# Patient Record
Sex: FEMALE | Race: WHITE | Employment: OTHER | URBAN - NONMETROPOLITAN AREA
[De-identification: names, ages, dates, MRNs, and addresses within clinical notes are randomized per-mention and may not be internally consistent; named-entity substitution may affect disease eponyms.]

---

## 2017-09-27 ENCOUNTER — NURSE TRIAGE (OUTPATIENT)
Dept: ADMINISTRATIVE | Age: 77
End: 2017-09-27

## 2018-07-23 ENCOUNTER — HOSPITAL ENCOUNTER (OUTPATIENT)
Age: 78
Discharge: HOME | End: 2018-07-23
Payer: MEDICARE

## 2018-07-23 LAB
ALBUMIN SERPL-MCNC: 4.1 G/DL (ref 3.5–5.1)
ALP BLD-CCNC: 69 U/L (ref 38–126)
ALT SERPL-CCNC: 28 U/L (ref 11–66)
ANION GAP SERPL CALCULATED.3IONS-SCNC: 14 MEQ/L (ref 8–16)
AST SERPL-CCNC: 26 U/L (ref 5–40)
BASOPHILS # BLD: 1 %
BASOPHILS ABSOLUTE: 0.1 THOU/MM3 (ref 0–0.1)
BILIRUB SERPL-MCNC: 0.5 MG/DL (ref 0.3–1.2)
BILIRUBIN DIRECT: < 0.2 MG/DL (ref 0–0.3)
BUN BLDV-MCNC: 15 MG/DL (ref 7–22)
CALCIUM SERPL-MCNC: 9.7 MG/DL (ref 8.5–10.5)
CHLORIDE BLD-SCNC: 99 MEQ/L (ref 98–111)
CHOLESTEROL, TOTAL: 190 MG/DL (ref 100–199)
CO2: 29 MEQ/L (ref 23–33)
CREAT SERPL-MCNC: 0.7 MG/DL (ref 0.4–1.2)
EOSINOPHIL # BLD: 1.8 %
EOSINOPHILS ABSOLUTE: 0.1 THOU/MM3 (ref 0–0.4)
ERYTHROCYTE [DISTWIDTH] IN BLOOD BY AUTOMATED COUNT: 14.4 % (ref 11.5–14.5)
ERYTHROCYTE [DISTWIDTH] IN BLOOD BY AUTOMATED COUNT: 43.7 FL (ref 35–45)
GFR SERPL CREATININE-BSD FRML MDRD: 81 ML/MIN/1.73M2
GLUCOSE BLD-MCNC: 99 MG/DL (ref 70–108)
HCT VFR BLD CALC: 44.9 % (ref 37–47)
HDLC SERPL-MCNC: 59 MG/DL
HEMOGLOBIN: 14.4 GM/DL (ref 12–16)
IMMATURE GRANS (ABS): 0.02 THOU/MM3 (ref 0–0.07)
IMMATURE GRANULOCYTES: 0.3 %
LDL CHOLESTEROL CALCULATED: 112 MG/DL
LYMPHOCYTES # BLD: 28.4 %
LYMPHOCYTES ABSOLUTE: 2.3 THOU/MM3 (ref 1–4.8)
MCH RBC QN AUTO: 27.1 PG (ref 26–33)
MCHC RBC AUTO-ENTMCNC: 32.1 GM/DL (ref 32.2–35.5)
MCV RBC AUTO: 84.4 FL (ref 81–99)
MONOCYTES # BLD: 9.4 %
MONOCYTES ABSOLUTE: 0.8 THOU/MM3 (ref 0.4–1.3)
NUCLEATED RED BLOOD CELLS: 0 /100 WBC
PLATELET # BLD: 235 THOU/MM3 (ref 130–400)
PMV BLD AUTO: 10.4 FL (ref 9.4–12.4)
POTASSIUM SERPL-SCNC: 4 MEQ/L (ref 3.5–5.2)
RBC # BLD: 5.32 MILL/MM3 (ref 4.2–5.4)
SEG NEUTROPHILS: 59.1 %
SEGMENTED NEUTROPHILS ABSOLUTE COUNT: 4.7 THOU/MM3 (ref 1.8–7.7)
SODIUM BLD-SCNC: 142 MEQ/L (ref 135–145)
TOTAL PROTEIN: 7.9 G/DL (ref 6.1–8)
TRIGL SERPL-MCNC: 93 MG/DL (ref 0–199)
TSH SERPL DL<=0.05 MIU/L-ACNC: 0.93 UIU/ML (ref 0.4–4.2)
WBC # BLD: 8 THOU/MM3 (ref 4.8–10.8)

## 2018-07-23 PROCEDURE — 80053 COMPREHEN METABOLIC PANEL: CPT

## 2018-07-23 PROCEDURE — 85025 COMPLETE CBC W/AUTO DIFF WBC: CPT

## 2018-07-23 PROCEDURE — 80061 LIPID PANEL: CPT

## 2018-07-23 PROCEDURE — 82248 BILIRUBIN DIRECT: CPT

## 2018-07-23 PROCEDURE — 36415 COLL VENOUS BLD VENIPUNCTURE: CPT

## 2018-07-23 PROCEDURE — 84443 ASSAY THYROID STIM HORMONE: CPT

## 2018-09-25 ENCOUNTER — INITIAL CONSULT (OUTPATIENT)
Dept: PULMONOLOGY | Age: 78
End: 2018-09-25
Payer: MEDICARE

## 2018-09-25 VITALS
OXYGEN SATURATION: 95 % | DIASTOLIC BLOOD PRESSURE: 78 MMHG | WEIGHT: 222.8 LBS | HEIGHT: 65 IN | BODY MASS INDEX: 37.12 KG/M2 | HEART RATE: 107 BPM | SYSTOLIC BLOOD PRESSURE: 116 MMHG

## 2018-09-25 DIAGNOSIS — R06.83 SNORING: ICD-10-CM

## 2018-09-25 DIAGNOSIS — G47.9 RESTLESS SLEEPER: ICD-10-CM

## 2018-09-25 DIAGNOSIS — I10 ESSENTIAL HYPERTENSION: ICD-10-CM

## 2018-09-25 DIAGNOSIS — E66.9 OBESITY (BMI 30-39.9): ICD-10-CM

## 2018-09-25 DIAGNOSIS — G47.10 HYPERSOMNIA: Primary | ICD-10-CM

## 2018-09-25 DIAGNOSIS — R06.81 WITNESSED EPISODE OF APNEA: ICD-10-CM

## 2018-09-25 PROCEDURE — 4040F PNEUMOC VAC/ADMIN/RCVD: CPT | Performed by: INTERNAL MEDICINE

## 2018-09-25 PROCEDURE — 1036F TOBACCO NON-USER: CPT | Performed by: INTERNAL MEDICINE

## 2018-09-25 PROCEDURE — G8400 PT W/DXA NO RESULTS DOC: HCPCS | Performed by: INTERNAL MEDICINE

## 2018-09-25 PROCEDURE — 99203 OFFICE O/P NEW LOW 30 MIN: CPT | Performed by: INTERNAL MEDICINE

## 2018-09-25 PROCEDURE — G8417 CALC BMI ABV UP PARAM F/U: HCPCS | Performed by: INTERNAL MEDICINE

## 2018-09-25 PROCEDURE — 1123F ACP DISCUSS/DSCN MKR DOCD: CPT | Performed by: INTERNAL MEDICINE

## 2018-09-25 PROCEDURE — G8427 DOCREV CUR MEDS BY ELIG CLIN: HCPCS | Performed by: INTERNAL MEDICINE

## 2018-09-25 PROCEDURE — 1090F PRES/ABSN URINE INCON ASSESS: CPT | Performed by: INTERNAL MEDICINE

## 2018-09-25 PROCEDURE — 1101F PT FALLS ASSESS-DOCD LE1/YR: CPT | Performed by: INTERNAL MEDICINE

## 2018-09-25 RX ORDER — POTASSIUM CHLORIDE 1.5 G/1.77G
20 POWDER, FOR SOLUTION ORAL 2 TIMES DAILY
COMMUNITY
End: 2022-03-03 | Stop reason: DRUGHIGH

## 2018-09-25 RX ORDER — BUMETANIDE 1 MG/1
1 TABLET ORAL DAILY
COMMUNITY
End: 2022-10-04 | Stop reason: SDUPTHER

## 2018-09-25 NOTE — PROGRESS NOTES
Sig Dispense Refill    potassium chloride (KLOR-CON) 20 MEQ packet Take 20 mEq by mouth 2 times daily      bumetanide (BUMEX) 1 MG tablet Take 1 mg by mouth daily      diltiazem (CARDIZEM CD) 120 MG extended release capsule Take 120 mg by mouth daily      metoprolol succinate (TOPROL XL) 25 MG extended release tablet Take 25 mg by mouth daily      aspirin 81 MG tablet Take 81 mg by mouth daily. No current facility-administered medications for this visit. Family History   Problem Relation Age of Onset   [facility] Cancer Mother     Stroke Mother     Diabetes Sister     Diabetes Brother     Heart Disease Neg Hx     High Blood Pressure Neg Hx         Any family history of any sleep problems or any one in your family on CPAP? No    Caffeine Intake: How much soda (pop), coffee, tea, power drinks do you ingest per day?1 up of coffee and 3 cans of pop per day. Employment History:  Where do you work? Retired    Physical Exam:    HEIGHTHeight: 5' 5\" (165.1 cm) WEIGHTWeight: 222 lb 12.8 oz (101.1 kg)    BMI:  Body mass index is 37.08 kg/m². Neck Size: 18.75  Oxygen Sat: 95%    ESS:  6  Vitals: /78   Pulse 107   Ht 5' 5\" (1.651 m)   Wt 222 lb 12.8 oz (101.1 kg)   SpO2 95% Comment: room air at rest  BMI 37.08 kg/m²       Mallampati Score: 3  General appearance: BMI 37  Nose: Nares normal. Septum midline. Mucosa normal. No drainage or sinus tenderness. Oropharynx:  Large tongue, crowded pharynx, mallampati class 3, upper and lower dentures  Lungs: clear to auscultation bilaterally  Heart: regular rate and rhythm, S1, S2 normal, no murmur, click, rub or gallop  Extremities: 1+ LE edema  Neurologic: Mental status: Alert, oriented, thought content appropriate      Assessment    Diagnosis Orders   1. Hypersomnia  Baseline Diagnostic Sleep Study   2. Obesity (BMI 30-39. 9)  Baseline Diagnostic Sleep Study   3. Snoring  Baseline Diagnostic Sleep Study   4.  Restless sleeper  Baseline Diagnostic Sleep Study 5. Witnessed episode of apnea  Baseline Diagnostic Sleep Study   6. Essential hypertension  Baseline Diagnostic Sleep Study       Plan      PSG with RBD montage    Mask Desensitization and Pre study teaching? No  Weight Loss Information Given? Yes  Sleep Hygiene Discussed?  Yes

## 2018-09-25 NOTE — LETTER
[address] [facility]  [address]  [address]  [place] [address]  Phone: [phone]  Fax: [phone]    No ref. provider found        September 25, 2018       Patient: Dmitri You   MR Number: [id number]   YOB: 1940   Date of Visit: 9/25/2018       Dear Dr. Peter Cohen ref. provider found: Thank you for the request for consultation for Krys Parrish to me for the evaluation of MAMI. Below are the relevant portions of my assessment and plan of care. CC: Hypersomnia     New Sleep Patient H/P    Presentation:  Keyshawn Arambula is referred by [facility], [facility]. for daytime sleepiness  Keyshawn Arambula c/o excessive daytime somnolence, frequent arousals and makes funny noises during the night, hollers and acts on her dreams, snores loudly. Her sleep is non refreshing with multiple arousals for no apparent reason. , takes naps daily. Cannot sleep on her back, sleeps on her side and with HOB elevate, very restless sleeper    Symptoms began:  1 year ago. Symptoms include: snoring, choking, periods of not breathing during sleep, excessive daytime sleepiness, disrupted sleep, naps    Previous evaluation and treatment? No        Time in Bed:   Bedtime: 11p.m. Awakens  6 a.m. Different on weekends? No      Niesha falls asleep in 10  minutes. Any awakenings? Yes  Difficulty Falling back to sleep? No    Naps:  Any naps? Yes and are they helpful Yes    Snoring and Apneas:  Do you snore or been told you a snore? Yes  How long have known about your snoring? years  Any witnessed apneas? Yes  Any awakenings with choking or gasping? No    Dreams:  Any recurring dreams? Yes  Hallucinations? No  Sleep Paralysis? No  Symptoms of Cataplexy? No    Driving History:  Do you have a CDL or drive long distances for work? No  Any driving accidents in the past year? No  Any sleepiness while driving? No    Weight:  Any change in weight over the past year? Yes   How about past 5 years? Yes  How much?  10 Other Compliants :Sharif Cruz complains of tossing and turning, kicking as well. Past Medical History:   Diagnosis Date    Arthritis     general       Past Surgical History:   Procedure Laterality Date    APPENDECTOMY  2014    [facility]ATION  07[facility]07/[facility]    CHOLECYSTECTOMY  2002    HYSTERECTOMY  1990       Social History   Substance Use Topics    Smoking status: Never Smoker    Smokeless tobacco: Never Used    Alcohol use No       No Known Allergies    Current Outpatient Prescriptions   Medication Sig Dispense Refill    potassium chloride (KLOR-CON) 20 MEQ packet Take 20 mEq by mouth 2 times daily      bumetanide (BUMEX) 1 MG tablet Take 1 mg by mouth daily      diltiazem (CARDIZEM CD) 120 MG extended release capsule Take 120 mg by mouth daily      metoprolol succinate (TOPROL XL) 25 MG extended release tablet Take 25 mg by mouth daily      aspirin 81 MG tablet Take 81 mg by mouth daily. No current facility-administered medications for this visit. Family History   Problem Relation Age of Onset   [facility] Cancer Mother     Stroke Mother     Diabetes Sister     Diabetes Brother     Heart Disease Neg Hx     High Blood Pressure Neg Hx         Any family history of any sleep problems or any one in your family on CPAP? No    Caffeine Intake: How much soda (pop), coffee, tea, power drinks do you ingest per day?1 up of coffee and 3 cans of pop per day. Employment History:  Where do you work? Retired    Physical Exam:    HEIGHTHeight: 5' 5\" (165.1 cm) WEIGHTWeight: 222 lb 12.8 oz (101.1 kg)    BMI:  Body mass index is 37.08 kg/m². Neck Size: 18.75  Oxygen Sat: 95%    ESS:  6  Vitals: /78   Pulse 107   Ht 5' 5\" (1.651 m)   Wt 222 lb 12.8 oz (101.1 kg)   SpO2 95% Comment: room air at rest  BMI 37.08 kg/m²        Mallampati Score: 3  General appearance: BMI 37  Nose: Nares normal. Septum midline. Mucosa normal. No drainage or sinus tenderness. Oropharynx:  Large tongue, crowded pharynx, mallampati class 3, upper and lower dentures  Lungs: clear to auscultation bilaterally  Heart: regular rate and rhythm, S1, S2 normal, no murmur, click, rub or gallop  Extremities: 1+ LE edema  Neurologic: Mental status: Alert, oriented, thought content appropriate      Assessment    Diagnosis Orders   1. Hypersomnia  Baseline Diagnostic Sleep Study   2. Obesity (BMI 30-39. 9)  Baseline Diagnostic Sleep Study   3. Snoring  Baseline Diagnostic Sleep Study   4. Restless sleeper  Baseline Diagnostic Sleep Study   5. Witnessed episode of apnea  Baseline Diagnostic Sleep Study   6. Essential hypertension  Baseline Diagnostic Sleep Study       Plan      PSG with RBD montage    Mask Desensitization and Pre study teaching? No  Weight Loss Information Given? Yes  Sleep Hygiene Discussed? Yes        If you have questions, please do not hesitate to call me. I look forward to following Rebecca Arguello along with you.     Sincerely,        Marce Siddiqui MD    CC providers:  No Recipients

## 2018-09-25 NOTE — COMMUNICATION BODY
CC: Hypersomnia     New Sleep Patient H/P    Presentation:  Sujey Encinas is referred by Darion Stephanie for daytime sleepiness  Sujey Encinas c/o excessive daytime somnolence, frequent arousals and makes funny noises during the night, hollers and acts on her dreams, snores loudly. Her sleep is non refreshing with multiple arousals for no apparent reason. , takes naps daily. Cannot sleep on her back, sleeps on her side and with HOB elevate, very restless sleeper    Symptoms began:  1 year ago. Symptoms include: snoring, choking, periods of not breathing during sleep, excessive daytime sleepiness, disrupted sleep, naps    Previous evaluation and treatment? No        Time in Bed:   Bedtime: 11p.m. Awakens  6 a.m. Different on weekends? No      Niesha falls asleep in 10  minutes. Any awakenings? Yes  Difficulty Falling back to sleep? No    Naps:  Any naps? Yes and are they helpful Yes    Snoring and Apneas:  Do you snore or been told you a snore? Yes  How long have known about your snoring? years  Any witnessed apneas? Yes  Any awakenings with choking or gasping? No    Dreams:  Any recurring dreams? Yes  Hallucinations? No  Sleep Paralysis? No  Symptoms of Cataplexy? No    Driving History:  Do you have a CDL or drive long distances for work? No  Any driving accidents in the past year? No  Any sleepiness while driving? No    Weight:  Any change in weight over the past year? Yes   How about past 5 years? Yes  How much? 10    Other Compliants :Sujey Encinas complains of tossing and turning, kicking as well.     Past Medical History:   Diagnosis Date    Arthritis     general       Past Surgical History:   Procedure Laterality Date    APPENDECTOMY  2014    [facility]    CHOLECYSTECTOMY  2002    HYSTERECTOMY  1990       Social History   Substance Use Topics    Smoking status: Never Smoker    Smokeless tobacco: Never Used    Alcohol use No       No Known Allergies    Current Outpatient Prescriptions   Medication 5. Witnessed episode of apnea  Baseline Diagnostic Sleep Study   6. Essential hypertension  Baseline Diagnostic Sleep Study       Plan      PSG with RBD montage    Mask Desensitization and Pre study teaching? No  Weight Loss Information Given? Yes  Sleep Hygiene Discussed?  Yes

## 2018-10-18 ENCOUNTER — HOSPITAL ENCOUNTER (OUTPATIENT)
Dept: SLEEP CENTER | Age: 78
Discharge: HOME | End: 2018-10-20
Payer: MEDICARE

## 2018-10-18 DIAGNOSIS — G47.9 RESTLESS SLEEPER: ICD-10-CM

## 2018-10-18 DIAGNOSIS — R06.81 WITNESSED EPISODE OF APNEA: ICD-10-CM

## 2018-10-18 DIAGNOSIS — I10 ESSENTIAL HYPERTENSION: ICD-10-CM

## 2018-10-18 DIAGNOSIS — G47.10 HYPERSOMNIA: ICD-10-CM

## 2018-10-18 DIAGNOSIS — E66.9 OBESITY (BMI 30-39.9): ICD-10-CM

## 2018-10-18 DIAGNOSIS — R06.83 SNORING: ICD-10-CM

## 2018-10-18 PROCEDURE — 95810 POLYSOM 6/> YRS 4/> PARAM: CPT

## 2018-10-22 LAB — STATUS: NORMAL

## 2018-10-25 NOTE — PROGRESS NOTES
[address] [place] [address]                              SLEEP STUDY REPORT    PATIENT NAME: Iban Townsend                       :         1940  MED REC NO:   [id number]                           ROOM:  ACCOUNT NO:   [de-identified]                           ADMIT DATE:  10/18/2018  PROVIDER:     CHELITA Wells Dial:  10/18/2018    DIAGNOSTIC POLYSOMNOGRAM    REFERRING PROVIDER:  Catina Ward. Dnoald Akins M.D. HISTORY OF PRESENT ILLNESS:  The patient is a 27-year-old female,  referred by her cardiologist for concern regarding obstructive sleep  apnea due to complaints of snoring, restless sleep, chronic fatigue. Her weight is 222 pounds, height 65 inches, BMI 36.9. METHODS:  The patient underwent digital polysomnography in compliance  with the standards and specifications from the AASM Manual including  the simultaneous recording of 3 EEG channels (F4-M1, C4-M1, and O2-M1  with back up electrodes F3-M2, C3-M2, and O1-M2), 2 EOG channels  (E1-M2, and E2-M1,), EMG (chin, left & right leg), EKG, Nonin pulse  oximetry with less than 2 second averaging time, body position,  airflow recorded by oral-nasal thermal sensor and nasal air pressure  transducer, plus respiratory effort recorded by calibrated respiratory  inductance plethysmography (RIP), flow volume loop, sound and video. Sleep staging and scoring followed the standard put forth by the  American Academy of Sleep Medicine and utilized the 4A obstructive  hypopnea event desaturation of 4 percent or greater. DETAILS OF THE STUDY: Lights out 09:49 p.m., lights on 04:23 a.m. Total recording time  393 minutes, sleep period time 352 minutes, total sleep time 211  minutes, sleep efficiency 53.8%. Latency to sleep 40 minutes. Wake  after sleep onset 141 minutes. Latency to  minutes.     SLEEP STAGING:  The patient slept 39.5 minutes or 18.7% of total sleep  time in N1 sleep, 139 minutes or 65.7% in N2 sleep, 19.5 minutes or  9.2% in N3 sleep, 13.5 minutes or 6.4% in REM sleep. RESPIRATORY SUMMARY:  Zero apneas, 24 obstructive hypopneas for an AHI  of 6.8; however during REM sleep the obstructive events become severe  with the REM AHI of 35.6. BODY POSITION SUMMARY:  43 minutes of prone sleep, 158.5 minutes of  right side sleep. SLEEP DISTURBANCE SUMMARY:  There were total of 58 arousals, out of  which 28 were related to respiratory events for a respiratory arousal  index of 7.9. PERIODIC LIMB MOVEMENT SUMMARY:  There were none. PULSE OXIMETRY SUMMARY:  Mean oxygen saturation during wakefulness  92.5%, during  sleep 90.7%. Lowest oxygen saturation 83%. There were 12 minutes of  oxygen  saturation below 88%. ECG SUMMARY:  The patient remained in normal sinus rhythm with a mean  heart rate of 79  beats per minute during sleep. ASSESSMENT:  Mild obstructive sleep apnea during non-supine sleep with  an AHI of 6.8, lack of supine recording can underestimate severity of MAMI. Obstructive events become severe during REM sleep with a REM AHI  of 35.6. Sleep disruption and nocturnal oxygen desaturation  associated to obstructive events. RECOMMENDATIONS:  Due to the severity of the disruption, I would  strongly recommend PAP therapies. The patient will be contacted. The  findings and therapeutic alternative will be reviewed and we will  proceed accordingly.         Nely Ribeiro M.D.    D: 10/24/2018 21:36:51       T: 10/25/2018 0:17:09     [email or web address]  Job#: [id number]     Doc#: [id number]    CC:

## 2018-11-06 ENCOUNTER — OFFICE VISIT (OUTPATIENT)
Dept: PULMONOLOGY | Age: 78
End: 2018-11-06
Payer: MEDICARE

## 2018-11-06 VITALS
DIASTOLIC BLOOD PRESSURE: 82 MMHG | BODY MASS INDEX: 36.82 KG/M2 | OXYGEN SATURATION: 96 % | WEIGHT: 221 LBS | HEIGHT: 65 IN | SYSTOLIC BLOOD PRESSURE: 130 MMHG | HEART RATE: 80 BPM

## 2018-11-06 DIAGNOSIS — G47.10 HYPERSOMNIA: ICD-10-CM

## 2018-11-06 DIAGNOSIS — G47.33 OSA (OBSTRUCTIVE SLEEP APNEA): Primary | ICD-10-CM

## 2018-11-06 PROCEDURE — 1123F ACP DISCUSS/DSCN MKR DOCD: CPT | Performed by: INTERNAL MEDICINE

## 2018-11-06 PROCEDURE — G8400 PT W/DXA NO RESULTS DOC: HCPCS | Performed by: INTERNAL MEDICINE

## 2018-11-06 PROCEDURE — 1090F PRES/ABSN URINE INCON ASSESS: CPT | Performed by: INTERNAL MEDICINE

## 2018-11-06 PROCEDURE — 99213 OFFICE O/P EST LOW 20 MIN: CPT | Performed by: INTERNAL MEDICINE

## 2018-11-06 PROCEDURE — 4040F PNEUMOC VAC/ADMIN/RCVD: CPT | Performed by: INTERNAL MEDICINE

## 2018-11-06 PROCEDURE — 1036F TOBACCO NON-USER: CPT | Performed by: INTERNAL MEDICINE

## 2018-11-06 PROCEDURE — G8417 CALC BMI ABV UP PARAM F/U: HCPCS | Performed by: INTERNAL MEDICINE

## 2018-11-06 PROCEDURE — G8427 DOCREV CUR MEDS BY ELIG CLIN: HCPCS | Performed by: INTERNAL MEDICINE

## 2018-11-06 PROCEDURE — G8484 FLU IMMUNIZE NO ADMIN: HCPCS | Performed by: INTERNAL MEDICINE

## 2018-11-06 PROCEDURE — 1101F PT FALLS ASSESS-DOCD LE1/YR: CPT | Performed by: INTERNAL MEDICINE

## 2018-11-06 RX ORDER — NEBULIZER ACCESSORIES
EACH MISCELLANEOUS
Qty: 1 EACH | Refills: 0 | Status: SHIPPED | OUTPATIENT
Start: 2018-11-06

## 2018-11-07 ENCOUNTER — HOSPITAL ENCOUNTER (OUTPATIENT)
Dept: SLEEP CENTER | Age: 78
Discharge: HOME | End: 2018-11-09
Payer: MEDICARE

## 2018-11-07 DIAGNOSIS — G47.10 HYPERSOMNIA: ICD-10-CM

## 2018-11-07 DIAGNOSIS — G47.33 OSA (OBSTRUCTIVE SLEEP APNEA): ICD-10-CM

## 2018-11-07 PROCEDURE — 95811 POLYSOM 6/>YRS CPAP 4/> PARM: CPT

## 2018-11-08 LAB — STATUS: NORMAL

## 2018-11-12 DIAGNOSIS — G47.33 OSA (OBSTRUCTIVE SLEEP APNEA): Primary | ICD-10-CM

## 2018-11-12 NOTE — PROGRESS NOTES
[address] [place] [address]                               SLEEP STUDY REPORT    PATIENT NAME: Lisseth Pinto                       :        1940  MED REC NO:   [id number]                           ROOM:  ACCOUNT NO:   [de-identified]                           ADMIT DATE: 2018  PROVIDER:     Silverio Fierro M.D.    Isidro Zamora:  2018    CPAP TITRATION REPORT    REFERRING PROVIDER:  Silverio Fierro M.D. HISTORY OF PRESENT ILLNESS:  The patient is brought back to the sleep  lab for a PAP titration study with a diagnosis of obstructive sleep  apnea. METHODS:  The patient underwent digital polysomnography in compliance  with the standards and specifications from the AASM Manual including the  simultaneous recording of 3 EEG channels (F4-M1, C4-M1, and O2-M1 with  back up electrodes F3-M2, C3-M2, and O1-M2), 2 EOG channels (E1-M2, and  E2-M1,), EMG (chin, left & right leg), EKG, Nonin pulse oximetry with   less than 2 second averaging time, body position, airflow recorded by  oral-nasal thermal sensor and nasal air pressure transducer, plus  respiratory effort recorded by calibrated respiratory inductance  plethysmography (RIP), flow volume loop, sound and video. Sleep staging  and scoring followed the standard put forth by the American Academy of  Sleep Medicine and utilized the 4A obstructive hypopnea event  desaturation of 4 percent or greater. DETAILS OF THE STUDY:  Lights out 10:08 p.m., lights on 04:04 a.m. Total recording time 363 minutes, sleep period time 349 minutes, total  sleep time 207 minutes, sleep efficiency 57%. Latency to sleep 14.7  minutes, wake after sleep onset 141 minutes, latency to  minutes. SLEEP STAGING:  The patient slept 17.8% of total sleep time in N1 sleep,  71.8% in N2 sleep, 5.3% in N3 sleep, 5.1% in REM sleep.     BODY POSITION SUMMARY:  The patient slept 153 minutes supine, 54

## 2018-11-15 ENCOUNTER — TELEPHONE (OUTPATIENT)
Dept: SLEEP CENTER | Age: 78
End: 2018-11-15

## 2018-11-16 DIAGNOSIS — G47.33 OSA (OBSTRUCTIVE SLEEP APNEA): Primary | ICD-10-CM

## 2018-11-19 ENCOUNTER — TELEPHONE (OUTPATIENT)
Dept: SLEEP CENTER | Age: 78
End: 2018-11-19

## 2019-01-08 ENCOUNTER — OFFICE VISIT (OUTPATIENT)
Dept: PULMONOLOGY | Age: 79
End: 2019-01-08
Payer: MEDICARE

## 2019-01-08 VITALS
SYSTOLIC BLOOD PRESSURE: 124 MMHG | OXYGEN SATURATION: 95 % | HEART RATE: 78 BPM | DIASTOLIC BLOOD PRESSURE: 78 MMHG | WEIGHT: 222 LBS | BODY MASS INDEX: 36.99 KG/M2 | HEIGHT: 65 IN | RESPIRATION RATE: 22 BRPM

## 2019-01-08 DIAGNOSIS — G47.33 OSA ON CPAP: Primary | ICD-10-CM

## 2019-01-08 DIAGNOSIS — Z99.89 OSA ON CPAP: Primary | ICD-10-CM

## 2019-01-08 DIAGNOSIS — E66.9 OBESITY (BMI 30-39.9): ICD-10-CM

## 2019-01-08 PROCEDURE — 1123F ACP DISCUSS/DSCN MKR DOCD: CPT | Performed by: INTERNAL MEDICINE

## 2019-01-08 PROCEDURE — 1101F PT FALLS ASSESS-DOCD LE1/YR: CPT | Performed by: INTERNAL MEDICINE

## 2019-01-08 PROCEDURE — G8484 FLU IMMUNIZE NO ADMIN: HCPCS | Performed by: INTERNAL MEDICINE

## 2019-01-08 PROCEDURE — 1090F PRES/ABSN URINE INCON ASSESS: CPT | Performed by: INTERNAL MEDICINE

## 2019-01-08 PROCEDURE — 4040F PNEUMOC VAC/ADMIN/RCVD: CPT | Performed by: INTERNAL MEDICINE

## 2019-01-08 PROCEDURE — G8400 PT W/DXA NO RESULTS DOC: HCPCS | Performed by: INTERNAL MEDICINE

## 2019-01-08 PROCEDURE — 1036F TOBACCO NON-USER: CPT | Performed by: INTERNAL MEDICINE

## 2019-01-08 PROCEDURE — G8427 DOCREV CUR MEDS BY ELIG CLIN: HCPCS | Performed by: INTERNAL MEDICINE

## 2019-01-08 PROCEDURE — 99213 OFFICE O/P EST LOW 20 MIN: CPT | Performed by: INTERNAL MEDICINE

## 2019-01-08 PROCEDURE — G8417 CALC BMI ABV UP PARAM F/U: HCPCS | Performed by: INTERNAL MEDICINE

## 2019-02-15 ENCOUNTER — TELEPHONE (OUTPATIENT)
Dept: ADMINISTRATIVE | Age: 79
End: 2019-02-15

## 2019-07-15 ENCOUNTER — OFFICE VISIT (OUTPATIENT)
Dept: PULMONOLOGY | Age: 79
End: 2019-07-15
Payer: MEDICARE

## 2019-07-15 VITALS
BODY MASS INDEX: 36.29 KG/M2 | SYSTOLIC BLOOD PRESSURE: 122 MMHG | HEART RATE: 81 BPM | OXYGEN SATURATION: 93 % | WEIGHT: 217.8 LBS | DIASTOLIC BLOOD PRESSURE: 76 MMHG | HEIGHT: 65 IN

## 2019-07-15 DIAGNOSIS — E66.9 OBESITY (BMI 30-39.9): ICD-10-CM

## 2019-07-15 DIAGNOSIS — G47.33 OSA (OBSTRUCTIVE SLEEP APNEA): Primary | ICD-10-CM

## 2019-07-15 PROCEDURE — 1090F PRES/ABSN URINE INCON ASSESS: CPT | Performed by: INTERNAL MEDICINE

## 2019-07-15 PROCEDURE — G8400 PT W/DXA NO RESULTS DOC: HCPCS | Performed by: INTERNAL MEDICINE

## 2019-07-15 PROCEDURE — G8427 DOCREV CUR MEDS BY ELIG CLIN: HCPCS | Performed by: INTERNAL MEDICINE

## 2019-07-15 PROCEDURE — 1036F TOBACCO NON-USER: CPT | Performed by: INTERNAL MEDICINE

## 2019-07-15 PROCEDURE — G8417 CALC BMI ABV UP PARAM F/U: HCPCS | Performed by: INTERNAL MEDICINE

## 2019-07-15 PROCEDURE — 99213 OFFICE O/P EST LOW 20 MIN: CPT | Performed by: INTERNAL MEDICINE

## 2019-07-15 PROCEDURE — 1123F ACP DISCUSS/DSCN MKR DOCD: CPT | Performed by: INTERNAL MEDICINE

## 2019-07-15 PROCEDURE — 4040F PNEUMOC VAC/ADMIN/RCVD: CPT | Performed by: INTERNAL MEDICINE

## 2019-07-15 NOTE — PROGRESS NOTES
Sleep Medicine Follow Up  Tom Skinner MD    Yamileth Singh, 67 y.o.  [id number]     Nurses Notes   Jasmina Choi is a 6 month f/u with download    Date of Last Visit    1/8/19    Scan of Download        INTERVAL HISTORY         Yamileth Singh is a 67 y.o. old female who comes in for follow up regarding her obstructive sleep apnea. She is currently doing well using her positive airway pressure device. She is sleeping better at night with her machine and says she has less daytime sleepiness. The machine pressure settings are comfortable, the mask is reasonably comfortable and she is using the humidity. There have been no ER visits, hospital visits, any new issues or medication changes since the last visit here in sleep clinic. Past Medical History:   Diagnosis Date    Arthritis     general     Past Surgical History:   Procedure Laterality Date    APPENDECTOMY  2014    [facility]    CHOLECYSTECTOMY  2002    HYSTERECTOMY  1990       Social History     Tobacco Use    Smoking status: Never Smoker    Smokeless tobacco: Never Used   Substance Use Topics    Alcohol use: No    Drug use: No       ALLERGIES  No Known Allergies  Current Outpatient Medications   Medication Sig Dispense Refill    Respiratory Therapy Supplies (ADULT MASK) MISC Please do mask desensitization and provide mask of patient's choice. 1 each 0    potassium chloride (KLOR-CON) 20 MEQ packet Take 20 mEq by mouth 2 times daily      bumetanide (BUMEX) 1 MG tablet Take 1 mg by mouth daily      diltiazem (CARDIZEM CD) 120 MG extended release capsule Take 120 mg by mouth daily      metoprolol succinate (TOPROL XL) 25 MG extended release tablet Take 25 mg by mouth daily      aspirin 81 MG tablet Take 81 mg by mouth daily. No current facility-administered medications for this visit.       Family History   Problem Relation Age of Onset    Cancer Mother     Stroke Mother     Diabetes Sister     Diabetes Brother

## 2019-07-31 ENCOUNTER — HOSPITAL ENCOUNTER (OUTPATIENT)
Age: 79
Discharge: HOME | End: 2019-07-31
Payer: MEDICARE

## 2019-07-31 LAB
ALBUMIN SERPL-MCNC: 3.8 G/DL (ref 3.5–5.1)
ALP BLD-CCNC: 73 U/L (ref 38–126)
ALT SERPL-CCNC: 19 U/L (ref 11–66)
ANION GAP SERPL CALCULATED.3IONS-SCNC: 14 MEQ/L (ref 8–16)
AST SERPL-CCNC: 20 U/L (ref 5–40)
BASOPHILS # BLD: 0.8 %
BASOPHILS ABSOLUTE: 0.1 THOU/MM3 (ref 0–0.1)
BILIRUB SERPL-MCNC: 0.5 MG/DL (ref 0.3–1.2)
BILIRUBIN DIRECT: < 0.2 MG/DL (ref 0–0.3)
BUN BLDV-MCNC: 13 MG/DL (ref 7–22)
CALCIUM SERPL-MCNC: 9.5 MG/DL (ref 8.5–10.5)
CHLORIDE BLD-SCNC: 102 MEQ/L (ref 98–111)
CHOLESTEROL, TOTAL: 151 MG/DL (ref 100–199)
CO2: 29 MEQ/L (ref 23–33)
CREAT SERPL-MCNC: 0.7 MG/DL (ref 0.4–1.2)
EOSINOPHIL # BLD: 2.2 %
EOSINOPHILS ABSOLUTE: 0.1 THOU/MM3 (ref 0–0.4)
ERYTHROCYTE [DISTWIDTH] IN BLOOD BY AUTOMATED COUNT: 13.7 % (ref 11.5–14.5)
ERYTHROCYTE [DISTWIDTH] IN BLOOD BY AUTOMATED COUNT: 42.9 FL (ref 35–45)
GFR SERPL CREATININE-BSD FRML MDRD: 81 ML/MIN/1.73M2
GLUCOSE BLD-MCNC: 101 MG/DL (ref 70–108)
HCT VFR BLD CALC: 41.9 % (ref 37–47)
HDLC SERPL-MCNC: 55 MG/DL
HEMOGLOBIN: 13.4 GM/DL (ref 12–16)
IMMATURE GRANS (ABS): 0.02 THOU/MM3 (ref 0–0.07)
IMMATURE GRANULOCYTES: 0.3 %
LDL CHOLESTEROL CALCULATED: 76 MG/DL
LYMPHOCYTES # BLD: 29.4 %
LYMPHOCYTES ABSOLUTE: 1.9 THOU/MM3 (ref 1–4.8)
MCH RBC QN AUTO: 27.6 PG (ref 26–33)
MCHC RBC AUTO-ENTMCNC: 32 GM/DL (ref 32.2–35.5)
MCV RBC AUTO: 86.4 FL (ref 81–99)
MONOCYTES # BLD: 9.3 %
MONOCYTES ABSOLUTE: 0.6 THOU/MM3 (ref 0.4–1.3)
NUCLEATED RED BLOOD CELLS: 0 /100 WBC
PLATELET # BLD: 227 THOU/MM3 (ref 130–400)
PMV BLD AUTO: 10.4 FL (ref 9.4–12.4)
POTASSIUM SERPL-SCNC: 3.9 MEQ/L (ref 3.5–5.2)
RBC # BLD: 4.85 MILL/MM3 (ref 4.2–5.4)
SEG NEUTROPHILS: 58 %
SEGMENTED NEUTROPHILS ABSOLUTE COUNT: 3.7 THOU/MM3 (ref 1.8–7.7)
SODIUM BLD-SCNC: 145 MEQ/L (ref 135–145)
TOTAL PROTEIN: 7.2 G/DL (ref 6.1–8)
TRIGL SERPL-MCNC: 101 MG/DL (ref 0–199)
WBC # BLD: 6.4 THOU/MM3 (ref 4.8–10.8)

## 2019-07-31 PROCEDURE — 36415 COLL VENOUS BLD VENIPUNCTURE: CPT

## 2019-07-31 PROCEDURE — 85025 COMPLETE CBC W/AUTO DIFF WBC: CPT

## 2019-07-31 PROCEDURE — 80053 COMPREHEN METABOLIC PANEL: CPT

## 2019-07-31 PROCEDURE — 82248 BILIRUBIN DIRECT: CPT

## 2019-07-31 PROCEDURE — 80061 LIPID PANEL: CPT

## 2020-07-15 ENCOUNTER — OFFICE VISIT (OUTPATIENT)
Dept: PULMONOLOGY | Age: 80
End: 2020-07-15
Payer: MEDICARE

## 2020-07-15 VITALS
TEMPERATURE: 97.7 F | BODY MASS INDEX: 36.11 KG/M2 | OXYGEN SATURATION: 98 % | SYSTOLIC BLOOD PRESSURE: 118 MMHG | DIASTOLIC BLOOD PRESSURE: 78 MMHG | WEIGHT: 217 LBS | HEART RATE: 98 BPM

## 2020-07-15 PROCEDURE — 1123F ACP DISCUSS/DSCN MKR DOCD: CPT | Performed by: PHYSICIAN ASSISTANT

## 2020-07-15 PROCEDURE — G8400 PT W/DXA NO RESULTS DOC: HCPCS | Performed by: PHYSICIAN ASSISTANT

## 2020-07-15 PROCEDURE — 99213 OFFICE O/P EST LOW 20 MIN: CPT | Performed by: PHYSICIAN ASSISTANT

## 2020-07-15 PROCEDURE — 1036F TOBACCO NON-USER: CPT | Performed by: PHYSICIAN ASSISTANT

## 2020-07-15 PROCEDURE — G8427 DOCREV CUR MEDS BY ELIG CLIN: HCPCS | Performed by: PHYSICIAN ASSISTANT

## 2020-07-15 PROCEDURE — G8417 CALC BMI ABV UP PARAM F/U: HCPCS | Performed by: PHYSICIAN ASSISTANT

## 2020-07-15 PROCEDURE — 4040F PNEUMOC VAC/ADMIN/RCVD: CPT | Performed by: PHYSICIAN ASSISTANT

## 2020-07-15 PROCEDURE — 1090F PRES/ABSN URINE INCON ASSESS: CPT | Performed by: PHYSICIAN ASSISTANT

## 2020-07-15 ASSESSMENT — ENCOUNTER SYMPTOMS
ALLERGIC/IMMUNOLOGIC NEGATIVE: 1
BACK PAIN: 0
SHORTNESS OF BREATH: 0
COUGH: 0
CHEST TIGHTNESS: 0
NAUSEA: 0
WHEEZING: 0
STRIDOR: 0
EYES NEGATIVE: 1
DIARRHEA: 0

## 2020-07-15 NOTE — PROGRESS NOTES
[facility]      Jose Maria Landeros         [id number]  7/15/2020   Chief Complaint   Patient presents with    Follow-up     sleep f/u, last seen 7/15/19. Pt of Dr. Alfonso ADAIR Download:   Original or initial AHI: 68     Date of initial study: 10/18/18      Compliant  90%     Noncompliant 10 %     PAP Type AirSense 10 Level  6cmH2O    Avg Hrs/Day 5hrs 12min   AHI: 5.4   Recorded compliance dates , 6/7/20-7/6/20  Machine/Mfg: ResMed  Interface: pillows     Provider:    __[facility]           Denton Marking        __ [facility]    _x_ Amy Pickup            __[facility]   __[place]:       __Other    Neck Size: 18.75  Mallampati Mallampati 3      Here is a scan of the most recent download:                        Presentation:   Awilda Bey presents for sleep medicine follow up for obstructive sleep apnea  Since the last visit, Awilda Bey is doing well with PAP. She wakes occ at night and sometimes has trouble falling asleep. Her mask leaks at time. She denies dry mouth. Equipment issues: The pressure is  acceptable, the mask is acceptable     Sleep issues:  Do you feel better? Yes  More rested? Yes   Better concentration? yes    Progress History:   Since last visit any new medical issues? No  New ER or hospitlal visits? No  Any new or changes in medicines? No  Any new sleep medicines?  No        Past Medical History:   Diagnosis Date    Arthritis     general       Past Surgical History:   Procedure Laterality Date    APPENDECTOMY  2014    [facility]    CHOLECYSTECTOMY  2002    HYSTERECTOMY  1990       Social History     Tobacco Use    Smoking status: Never Smoker    Smokeless tobacco: Never Used   Substance Use Topics    Alcohol use: No    Drug use: No       No Known Allergies    Current Outpatient Medications   Medication Sig Dispense Refill    Respiratory Therapy Supplies (ADULT MASK) MISC Please do mask desensitization and provide mask of patient's choice. 1 each 0    potassium chloride (KLOR-CON) 20 MEQ packet Take 20 mEq by mouth 2 times daily      bumetanide (BUMEX) 1 MG tablet Take 1 mg by mouth daily      diltiazem (CARDIZEM CD) 120 MG extended release capsule Take 120 mg by mouth daily      metoprolol succinate (TOPROL XL) 25 MG extended release tablet Take 25 mg by mouth daily      aspirin 81 MG tablet Take 81 mg by mouth daily. No current facility-administered medications for this visit. Family History   Problem Relation Age of Onset   [facility] Cancer Mother     Stroke Mother     Diabetes Sister     Diabetes Brother     Heart Disease Neg Hx     High Blood Pressure Neg Hx         Review of Systems -   Review of Systems   Constitutional: Negative for activity change, appetite change, chills and fever. HENT: Negative for congestion and postnasal drip. Eyes: Negative. Respiratory: Negative for cough, chest tightness, shortness of breath, wheezing and stridor. Cardiovascular: Negative for chest pain and leg swelling. Gastrointestinal: Negative for diarrhea and nausea. Endocrine: Negative. Genitourinary: Negative. Musculoskeletal: Negative. Negative for arthralgias and back pain. Skin: Negative. Allergic/Immunologic: Negative. Neurological: Negative. Negative for dizziness and light-headedness. Psychiatric/Behavioral: Negative. All other systems reviewed and are negative. Physical Exam:    BMI:  Body mass index is 36.11 kg/m². Wt Readings from Last 3 Encounters:   07/15/20 217 lb (98.4 kg)   07/15/19 217 lb 12.8 oz (98.8 kg)   01/08/19 222 lb (100.7 kg)     Weight stable / unchanged  Vitals: /78 (Site: Left Upper Arm, Position: Sitting, Cuff Size: Large Adult)   Pulse 98   Temp 97.7 °F (36.5 °C) (Temporal)   Wt 217 lb (98.4 kg)   SpO2 98% Comment: r/a  BMI 36.11 kg/m²       Physical Exam  Constitutional:       Appearance: She is well-developed.    HENT:      Head: Normocephalic and atraumatic. Right Ear: External ear normal.      Left Ear: External ear normal.   Eyes:      Conjunctiva/sclera: Conjunctivae normal.      Pupils: Pupils are equal, round, and reactive to light. Neck:      Musculoskeletal: Normal range of motion and neck supple. Cardiovascular:      Rate and Rhythm: Normal rate and regular rhythm. Heart sounds: Normal heart sounds. Pulmonary:      Effort: Pulmonary effort is normal.      Breath sounds: Normal breath sounds. Musculoskeletal: Normal range of motion. Skin:     General: Skin is warm and dry. Neurological:      Mental Status: She is alert and oriented to person, place, and time. Psychiatric:         Behavior: Behavior normal.         Thought Content: Thought content normal.         Judgment: Judgment normal.           ASSESSMENT/DIAGNOSIS     Diagnosis Orders   1. MAMI (obstructive sleep apnea)     2. Obesity (BMI 30-39. 9)              Plan   Do you need any equipment today? Yes update supplies  - Will increase pressure to correct AHI  - Dicussed trying Melatonin or Tylenol PM for insomnia if she feels she needs it  - She  was advised to continue current positive airway pressure therapy with above described pressure. - She  advised to keep good compliance with current recommended pressure to get optimal results and clinical improvement  - Recommend 7-9 hours of sleep with PAP  - She was advised to call [employer] regarding supplies if needed.   -She call my office for earlier appointment if needed for worsening of sleep symptoms.   - She was instructed on weight loss  - Krzysztof Bautista was educated about my impression and plan. Patient verbalizesunderstanding.   We will see Whit Katie back in: 1 year with download    Information added by my medical assistant/LPN was reviewed today          Apollo Cyr PA-C, MPAS  7/15/2020

## 2020-08-11 ENCOUNTER — HOSPITAL ENCOUNTER (OUTPATIENT)
Age: 80
Discharge: HOME | End: 2020-08-11
Payer: MEDICARE

## 2020-08-11 LAB
ALBUMIN SERPL-MCNC: 4.1 G/DL (ref 3.5–5.1)
ALP BLD-CCNC: 70 U/L (ref 38–126)
ALT SERPL-CCNC: 20 U/L (ref 11–66)
ANION GAP SERPL CALCULATED.3IONS-SCNC: 11 MEQ/L (ref 8–16)
AST SERPL-CCNC: 20 U/L (ref 5–40)
BILIRUB SERPL-MCNC: 0.6 MG/DL (ref 0.3–1.2)
BILIRUBIN DIRECT: < 0.2 MG/DL (ref 0–0.3)
BUN BLDV-MCNC: 12 MG/DL (ref 7–22)
CALCIUM SERPL-MCNC: 9.8 MG/DL (ref 8.5–10.5)
CHLORIDE BLD-SCNC: 101 MEQ/L (ref 98–111)
CHOLESTEROL, TOTAL: 164 MG/DL (ref 100–199)
CO2: 29 MEQ/L (ref 23–33)
CREAT SERPL-MCNC: 0.8 MG/DL (ref 0.4–1.2)
ERYTHROCYTE [DISTWIDTH] IN BLOOD BY AUTOMATED COUNT: 14 % (ref 11.5–14.5)
ERYTHROCYTE [DISTWIDTH] IN BLOOD BY AUTOMATED COUNT: 44.7 FL (ref 35–45)
GFR SERPL CREATININE-BSD FRML MDRD: 69 ML/MIN/1.73M2
GLUCOSE BLD-MCNC: 98 MG/DL (ref 70–108)
HCT VFR BLD CALC: 42.9 % (ref 37–47)
HDLC SERPL-MCNC: 58 MG/DL
HEMOGLOBIN: 13.8 GM/DL (ref 12–16)
LDL CHOLESTEROL CALCULATED: 89 MG/DL
MCH RBC QN AUTO: 28.1 PG (ref 26–33)
MCHC RBC AUTO-ENTMCNC: 32.2 GM/DL (ref 32.2–35.5)
MCV RBC AUTO: 87.4 FL (ref 81–99)
PLATELET # BLD: 267 THOU/MM3 (ref 130–400)
PMV BLD AUTO: 10.7 FL (ref 9.4–12.4)
POTASSIUM SERPL-SCNC: 3.8 MEQ/L (ref 3.5–5.2)
RBC # BLD: 4.91 MILL/MM3 (ref 4.2–5.4)
SODIUM BLD-SCNC: 141 MEQ/L (ref 135–145)
T4 FREE: 1.18 NG/DL (ref 0.93–1.76)
TOTAL PROTEIN: 7.7 G/DL (ref 6.1–8)
TRIGL SERPL-MCNC: 86 MG/DL (ref 0–199)
TSH SERPL DL<=0.05 MIU/L-ACNC: 0.42 UIU/ML (ref 0.4–4.2)
WBC # BLD: 6.6 THOU/MM3 (ref 4.8–10.8)

## 2020-08-11 PROCEDURE — 80053 COMPREHEN METABOLIC PANEL: CPT

## 2020-08-11 PROCEDURE — 36415 COLL VENOUS BLD VENIPUNCTURE: CPT

## 2020-08-11 PROCEDURE — 85027 COMPLETE CBC AUTOMATED: CPT

## 2020-08-11 PROCEDURE — 80061 LIPID PANEL: CPT

## 2020-08-11 PROCEDURE — 84439 ASSAY OF FREE THYROXINE: CPT

## 2020-08-11 PROCEDURE — 84443 ASSAY THYROID STIM HORMONE: CPT

## 2020-08-11 PROCEDURE — 82248 BILIRUBIN DIRECT: CPT

## 2020-08-18 LAB
EKG P AXIS: NORMAL DEG
EKG P-R INTERVAL: NORMAL MS
EKG QRS INTERVAL: 102 MS
EKG QTC INTERVAL: NORMAL MS
HEART RATE: 100 BPM

## 2021-07-19 ENCOUNTER — OFFICE VISIT (OUTPATIENT)
Dept: PULMONOLOGY | Age: 81
End: 2021-07-19
Payer: MEDICARE

## 2021-07-19 VITALS
DIASTOLIC BLOOD PRESSURE: 82 MMHG | TEMPERATURE: 97.7 F | OXYGEN SATURATION: 98 % | SYSTOLIC BLOOD PRESSURE: 120 MMHG | BODY MASS INDEX: 34.52 KG/M2 | WEIGHT: 207.2 LBS | HEIGHT: 65 IN | HEART RATE: 77 BPM

## 2021-07-19 DIAGNOSIS — G47.33 OSA (OBSTRUCTIVE SLEEP APNEA): Primary | ICD-10-CM

## 2021-07-19 DIAGNOSIS — E66.9 OBESITY (BMI 30-39.9): ICD-10-CM

## 2021-07-19 PROCEDURE — 1090F PRES/ABSN URINE INCON ASSESS: CPT | Performed by: PHYSICIAN ASSISTANT

## 2021-07-19 PROCEDURE — 4040F PNEUMOC VAC/ADMIN/RCVD: CPT | Performed by: PHYSICIAN ASSISTANT

## 2021-07-19 PROCEDURE — 99213 OFFICE O/P EST LOW 20 MIN: CPT | Performed by: PHYSICIAN ASSISTANT

## 2021-07-19 PROCEDURE — 1123F ACP DISCUSS/DSCN MKR DOCD: CPT | Performed by: PHYSICIAN ASSISTANT

## 2021-07-19 PROCEDURE — G8427 DOCREV CUR MEDS BY ELIG CLIN: HCPCS | Performed by: PHYSICIAN ASSISTANT

## 2021-07-19 PROCEDURE — G8400 PT W/DXA NO RESULTS DOC: HCPCS | Performed by: PHYSICIAN ASSISTANT

## 2021-07-19 PROCEDURE — 1036F TOBACCO NON-USER: CPT | Performed by: PHYSICIAN ASSISTANT

## 2021-07-19 PROCEDURE — G8419 CALC BMI OUT NRM PARAM NOF/U: HCPCS | Performed by: PHYSICIAN ASSISTANT

## 2021-07-19 ASSESSMENT — ENCOUNTER SYMPTOMS
BACK PAIN: 0
COUGH: 0
SHORTNESS OF BREATH: 0
NAUSEA: 0
STRIDOR: 0
DIARRHEA: 0
WHEEZING: 0
ALLERGIC/IMMUNOLOGIC NEGATIVE: 1
CHEST TIGHTNESS: 0
EYES NEGATIVE: 1

## 2021-07-19 NOTE — PROGRESS NOTES
[facility]      Luis A Jang         [id number]  7/19/2021   Chief Complaint   Patient presents with    Follow-up     Paola 1 year sleep follow up with download         Pt of Dr. Santos Bui    PAP Download:   James Grover or initial AHI: 68.0     Date of initial study: 10/18/2018      Compliant  100%     Noncompliant 0 %     PAP Type Airsense 10 autosetLevel  7   Avg Hrs/Day 5 hr 26 min  AHI: 4.6   Recorded compliance dates , 6/16/2021  to 7/15/2021   Machine/Mfg:   [x] ResMed    [] Respironics/Dreamstation   Interface:   [] Nasal    [x] Nasal pillows   [] FFM      Provider:      [] [facility]     [][employer]     [] [employer]    [x] [employer]    [] [facility]               [] [facility] [facility]      [] [employer]    [] [address]      [] Other    Neck Size: 18.75  Mallampati Mallampati 3  ESS:  2  SAQLI: 77    Here is a scan of the most recent download:            Presentation:   Dionne Snellen presents for sleep medicine follow up for obstructive sleep apnea  Since the last visit, Dionne Snellen is doing well with PAP. She is occ struggling with insomnia. She states it has been worse since her  passed in Jan.  She feels rested. Equipment issues: The pressure is  acceptable, the mask is acceptable     Sleep issues:  Do you feel better? Yes  More rested? Yes   Better concentration? yes    Progress History:   Since last visit any new medical issues? No  New ER or hospital visits? No  Any new or changes in medicines? No  Any new sleep medicines? No    Review of Systems -   Review of Systems   Constitutional: Negative for activity change, appetite change, chills and fever. HENT: Negative for congestion and postnasal drip. Eyes: Negative. Respiratory: Negative for cough, chest tightness, shortness of breath, wheezing and stridor. Cardiovascular: Negative for chest pain and leg swelling. Gastrointestinal: Negative for diarrhea and nausea. Endocrine: Negative. Genitourinary: Negative. Musculoskeletal: Negative. Negative for arthralgias and back pain. Skin: Negative. Allergic/Immunologic: Negative. Neurological: Negative. Negative for dizziness and light-headedness. Psychiatric/Behavioral: Negative. All other systems reviewed and are negative. Physical Exam:    BMI:  Body mass index is 34.48 kg/m². Wt Readings from Last 3 Encounters:   07/19/21 207 lb 3.2 oz (94 kg)   07/15/20 217 lb (98.4 kg)   07/15/19 217 lb 12.8 oz (98.8 kg)     Weight lost 10 lbs over 1 year  Vitals: /82 (Site: Right Upper Arm, Position: Sitting, Cuff Size: Large Adult)   Pulse 77   Temp 97.7 °F (36.5 °C) (Temporal)   Ht 5' 5\" (1.651 m)   Wt 207 lb 3.2 oz (94 kg)   SpO2 98% Comment: rm air at rest  BMI 34.48 kg/m²       Physical Exam  Constitutional:       Appearance: She is well-developed. HENT:      Head: Normocephalic and atraumatic. Right Ear: External ear normal.      Left Ear: External ear normal.   Eyes:      Conjunctiva/sclera: Conjunctivae normal.      Pupils: Pupils are equal, round, and reactive to light. Cardiovascular:      Rate and Rhythm: Normal rate and regular rhythm. Heart sounds: Normal heart sounds. Pulmonary:      Effort: Pulmonary effort is normal.      Breath sounds: Normal breath sounds. Musculoskeletal:         General: Normal range of motion. Cervical back: Normal range of motion and neck supple. Skin:     General: Skin is warm and dry. Neurological:      Mental Status: She is alert and oriented to person, place, and time. Psychiatric:         Behavior: Behavior normal.         Thought Content: Thought content normal.         Judgment: Judgment normal.         ASSESSMENT/DIAGNOSIS     Diagnosis Orders   1. MAMI (obstructive sleep apnea)     2. Obesity (BMI 30-39. 9)            Plan   Do you need any equipment today?  Yes update supplies  - Download reviewed and discussed with patient  - She  was advised to continue current positive airway pressure therapy with above described pressure. - She  advised to keep good compliance with current recommended pressure to get optimal results and clinical improvement  - Recommend 7-9 hours of sleep with PAP  - She was advised to call [employer] company regarding supplies if needed.   -She call my office for earlier appointment if needed for worsening of sleep symptoms.   - She was instructed on weight loss  - Marla Bautista was educated about my impression and plan. Patient verbalizesunderstanding.   We will see Madelin Fisher back in: 1 year with download    Information added by my medical assistant/LPN was reviewed today           Tamra Mcgowan PA-C, MPAS  7/19/2021

## 2021-07-23 ENCOUNTER — APPOINTMENT (OUTPATIENT)
Dept: GENERAL RADIOLOGY | Age: 81
End: 2021-07-23
Payer: MEDICARE

## 2021-07-23 ENCOUNTER — HOSPITAL ENCOUNTER (EMERGENCY)
Age: 81
Discharge: HOME | End: 2021-07-23
Payer: MEDICARE

## 2021-07-23 VITALS
DIASTOLIC BLOOD PRESSURE: 55 MMHG | WEIGHT: 205 LBS | SYSTOLIC BLOOD PRESSURE: 111 MMHG | BODY MASS INDEX: 34.16 KG/M2 | HEART RATE: 122 BPM | RESPIRATION RATE: 16 BRPM | HEIGHT: 65 IN | TEMPERATURE: 98.5 F | OXYGEN SATURATION: 96 %

## 2021-07-23 DIAGNOSIS — S63.501A SPRAIN OF RIGHT WRIST, INITIAL ENCOUNTER: Primary | ICD-10-CM

## 2021-07-23 PROCEDURE — 73110 X-RAY EXAM OF WRIST: CPT

## 2021-07-23 PROCEDURE — 73130 X-RAY EXAM OF HAND: CPT

## 2021-07-23 PROCEDURE — 99282 EMERGENCY DEPT VISIT SF MDM: CPT

## 2021-07-23 RX ORDER — NAPROXEN 500 MG/1
500 TABLET ORAL 2 TIMES DAILY
Qty: 60 TABLET | Refills: 0 | Status: SHIPPED | OUTPATIENT
Start: 2021-07-23 | End: 2021-07-23 | Stop reason: SDUPTHER

## 2021-07-23 RX ORDER — NAPROXEN 500 MG/1
500 TABLET ORAL 2 TIMES DAILY
Qty: 60 TABLET | Refills: 0 | Status: SHIPPED | OUTPATIENT
Start: 2021-07-23 | End: 2022-02-24

## 2021-07-23 ASSESSMENT — PAIN DESCRIPTION - ORIENTATION: ORIENTATION: RIGHT

## 2021-07-23 ASSESSMENT — PAIN SCALES - GENERAL: PAINLEVEL_OUTOF10: 5

## 2021-07-23 ASSESSMENT — PAIN DESCRIPTION - LOCATION: LOCATION: WRIST

## 2021-07-23 ASSESSMENT — PAIN DESCRIPTION - FREQUENCY: FREQUENCY: CONTINUOUS

## 2021-07-23 ASSESSMENT — PAIN DESCRIPTION - PAIN TYPE: TYPE: ACUTE PAIN

## 2021-07-23 NOTE — ED NOTES
Presents to ED for right wrist pain since this morning after falling. Denies any head injury or LOC. Rates pain 4/10. Shows no signs of distress. Vital signs as noted.       Chandrakant Crook RN  07/23/21 0280

## 2021-07-23 NOTE — ED PROVIDER NOTES
Reason for Visit: Wrist Pain (right)      HISTORY OF PRESENT ILLNESS       HPI: This [de-identified] y.o. femalepresents to the emergency department for right wrist pain status post falling this morning. Patient states she tripped over and fell onto her right hand today at [employer]. Aching in nature constant worse with certain motions movements touch notes makes better moderate severity. No numbness tingling right hand. Patient right-hand-dominant. No elbow or neck pain. No head or neck injury. Past Medical History:   Diagnosis Date    Arthritis     general       Past Surgical History:   Procedure Laterality Date    APPENDECTOMY  2014    [facility]    CHOLECYSTECTOMY  2002    [facility]  1990       [facility]   Social History     Socioeconomic History    Marital status:      Spouse name: Not on file    Number of children: Not on file    Years of education: Not on file    Highest education level: Not on file   Occupational History    Not on file   Tobacco Use    Smoking status: Never Smoker    Smokeless tobacco: Never Used   Substance and Sexual Activity    Alcohol use: No    Drug use: No    Sexual activity: Not on file   Other Topics Concern    Not on file   Social History Narrative    Not on file     Social Determinants of Health     Financial Resource Strain:     Difficulty of Paying Living Expenses:    Food Insecurity:     Worried About Running Out of Food in the Last Year:     [address] in the Last Year:    Transportation Needs:     Lack of Transportation (Medical):      Lack of Transportation (Non-Medical):    Physical Activity:     Days of Exercise per Week:     Minutes of Exercise per Session:    Stress:     Feeling of Stress :    Social Connections:     Frequency of Communication with Friends and Family:     Frequency of Social Gatherings with Friends and Family:     Attends Rastafari Services:     Active Member of Clubs or Organizations:     Attends Club or Organization Meetings:     Marital Status:    Intimate Partner Violence:     Fear of Current or Ex-Partner:     Emotionally Abused:     Physically Abused:     Sexually Abused:        Previous Medications    ASPIRIN 81 MG TABLET    Take 81 mg by mouth daily. BUMETANIDE (BUMEX) 1 MG TABLET    Take 1 mg by mouth daily    CPAP MACHINE MISC    by Does not apply route Please change CPAP pressure to 7 cm H20. DILTIAZEM (CARDIZEM CD) 120 MG EXTENDED RELEASE CAPSULE    Take 120 mg by mouth daily    METOPROLOL SUCCINATE (TOPROL XL) 25 MG EXTENDED RELEASE TABLET    Take 25 mg by mouth daily    POTASSIUM CHLORIDE (KLOR-CON) 20 MEQ PACKET    Take 20 mEq by mouth 2 times daily    RESPIRATORY THERAPY SUPPLIES (ADULT MASK) MISC    Please do mask desensitization and provide mask of patient's choice. Allergies: Allergies as of 07/23/2021    (No Known Allergies)       Review of Systems       See HPI for further details. At least 9 systems reviewed and are otherwise negative unless noted in the history of present illness. Constitutional: Denies fever or chills   Respiratory: Denies cough or shortness of breath   Cardiovascular: Denies chest pain or edema   Musculoskeletal: Denies back pain admits joint pain   Integument: Denies rash   Neurologic: Denies headache, focal weakness or sensory changes   Endocrine: Denies polyuria or polydipsia   Lymphatic: Denies swollen glands   Psychiatric: Denies depression or anxiety       Physical Exam       Vitals:    07/23/21 1730   BP: (!) 111/55   Pulse: 122   Resp: 16   Temp: 98.5 °F (36.9 °C)   TempSrc: Oral   SpO2: 96%   Weight: 205 lb (93 kg)   Height: 5' 5\" (1.651 m)       Constitutional: No acute distress, Non-toxic appearance; well nourished    HENT: Normocephalic, Atraumatic, Bilateral external ears normal, Oropharynx moist, No oral exudates, Nose normal.    Eyes: PERRLA, EOMI, Conjunctiva normal, No discharge.     Neck: Normal to the ER immediately for worsening concerns. Patient is advised to followup with primary care physician and/or referred physician in the next one to two days or sooner if worsening and to return to the ER immediately as above with any concerns. Usual and customary treatment and medication side effects and warning signs discussed. Patient was discharged from emergency department in good condition with all questions answered and patient has demonstrated capacity to understand these instructions and to follow up. Refer to the patient's discharge summary for more information on plan and follow-up. I have explained to the patient in appropriate terminology our work-up in the ED and their diagnosis. I have also given anticipatory guidance and expectant management of their condition as an outpatient as per my custom. They are instructed to return to the ED if their condition deteriorates in any way    This patient was seen under the direct supervision of the attending physician who was available for consultation. Differential Diagnosis   Sprain versus contusion versus fracture versus dislocation    Consults: None     Reevaluation:  Nurse applied a Velcro splint to the right wrist.  Post examination reveals neurovascular intact good alignment. DECISION to ADMIT / DISCHARGE:     5:57 PM    Clinical Impression       1.   1. Sprain of right wrist, initial encounter        Disposition       All results were shared with the patient, medical decision making was discussed and all questions were answered, and she agreed to assessment and plan. Patient was DISCHARGED from the hospital. Based on the reassuring ED workup and patient's stable vital signs, I feel the patient may be safely discharged home. At this point in time, I believe the patient has the mental capacity to make medical decisions.       LAWSON Barone  07/23/21 3111

## 2021-08-16 ENCOUNTER — HOSPITAL ENCOUNTER (OUTPATIENT)
Age: 81
Discharge: HOME | End: 2021-08-16
Payer: MEDICARE

## 2021-08-16 LAB
ALBUMIN SERPL-MCNC: 4.3 G/DL (ref 3.5–5.1)
ALP BLD-CCNC: 69 U/L (ref 38–126)
ALT SERPL-CCNC: 26 U/L (ref 11–66)
ANION GAP SERPL CALCULATED.3IONS-SCNC: 8 MEQ/L (ref 8–16)
AST SERPL-CCNC: 21 U/L (ref 5–40)
BILIRUB SERPL-MCNC: 0.4 MG/DL (ref 0.3–1.2)
BILIRUBIN DIRECT: < 0.2 MG/DL (ref 0–0.3)
BUN BLDV-MCNC: 20 MG/DL (ref 7–22)
CALCIUM SERPL-MCNC: 9.5 MG/DL (ref 8.5–10.5)
CHLORIDE BLD-SCNC: 105 MEQ/L (ref 98–111)
CHOLESTEROL, TOTAL: 170 MG/DL (ref 100–199)
CO2: 28 MEQ/L (ref 23–33)
CREAT SERPL-MCNC: 0.7 MG/DL (ref 0.4–1.2)
ERYTHROCYTE [DISTWIDTH] IN BLOOD BY AUTOMATED COUNT: 14 % (ref 11.5–14.5)
ERYTHROCYTE [DISTWIDTH] IN BLOOD BY AUTOMATED COUNT: 45.3 FL (ref 35–45)
GFR SERPL CREATININE-BSD FRML MDRD: 80 ML/MIN/1.73M2
GLUCOSE BLD-MCNC: 100 MG/DL (ref 70–108)
HCT VFR BLD CALC: 41.6 % (ref 37–47)
HDLC SERPL-MCNC: 58 MG/DL
HEMOGLOBIN: 13 GM/DL (ref 12–16)
LDL CHOLESTEROL CALCULATED: 94 MG/DL
MCH RBC QN AUTO: 27.4 PG (ref 26–33)
MCHC RBC AUTO-ENTMCNC: 31.3 GM/DL (ref 32.2–35.5)
MCV RBC AUTO: 87.8 FL (ref 81–99)
PLATELET # BLD: 238 THOU/MM3 (ref 130–400)
PMV BLD AUTO: 10.6 FL (ref 9.4–12.4)
POTASSIUM SERPL-SCNC: 4.3 MEQ/L (ref 3.5–5.2)
RBC # BLD: 4.74 MILL/MM3 (ref 4.2–5.4)
SODIUM BLD-SCNC: 141 MEQ/L (ref 135–145)
TOTAL PROTEIN: 7.1 G/DL (ref 6.1–8)
TRIGL SERPL-MCNC: 90 MG/DL (ref 0–199)
WBC # BLD: 6.7 THOU/MM3 (ref 4.8–10.8)

## 2021-08-16 PROCEDURE — 85027 COMPLETE CBC AUTOMATED: CPT

## 2021-08-16 PROCEDURE — 82248 BILIRUBIN DIRECT: CPT

## 2021-08-16 PROCEDURE — 36415 COLL VENOUS BLD VENIPUNCTURE: CPT

## 2021-08-16 PROCEDURE — 80053 COMPREHEN METABOLIC PANEL: CPT

## 2021-08-16 PROCEDURE — 80061 LIPID PANEL: CPT

## 2021-08-24 LAB
EKG P AXIS: NORMAL DEG
EKG P-R INTERVAL: NORMAL MS
EKG QRS INTERVAL: 102 MS
EKG QTC INTERVAL: NORMAL MS
HEART RATE: 72 BPM

## 2022-02-24 ENCOUNTER — HOSPITAL ENCOUNTER (EMERGENCY)
Age: 82
Discharge: HOME | End: 2022-02-24
Attending: EMERGENCY MEDICINE
Payer: MEDICARE

## 2022-02-24 VITALS
RESPIRATION RATE: 16 BRPM | DIASTOLIC BLOOD PRESSURE: 71 MMHG | TEMPERATURE: 98.5 F | OXYGEN SATURATION: 100 % | HEART RATE: 81 BPM | SYSTOLIC BLOOD PRESSURE: 141 MMHG

## 2022-02-24 DIAGNOSIS — M62.838 SPASM OF MUSCLE: ICD-10-CM

## 2022-02-24 DIAGNOSIS — S29.019A ACUTE THORACIC MYOFASCIAL STRAIN, INITIAL ENCOUNTER: Primary | ICD-10-CM

## 2022-02-24 PROCEDURE — 99213 OFFICE O/P EST LOW 20 MIN: CPT

## 2022-02-24 PROCEDURE — 99203 OFFICE O/P NEW LOW 30 MIN: CPT | Performed by: EMERGENCY MEDICINE

## 2022-02-24 PROCEDURE — 69209 REMOVE IMPACTED EAR WAX UNI: CPT

## 2022-02-24 RX ORDER — CYCLOBENZAPRINE HCL 5 MG
5 TABLET ORAL 3 TIMES DAILY PRN
Qty: 15 TABLET | Refills: 0 | Status: SHIPPED | OUTPATIENT
Start: 2022-02-24 | End: 2022-03-03

## 2022-02-24 RX ORDER — IBUPROFEN 400 MG/1
400 TABLET ORAL 3 TIMES DAILY PRN
Qty: 20 TABLET | Refills: 0 | Status: SHIPPED | OUTPATIENT
Start: 2022-02-24 | End: 2022-03-03

## 2022-02-24 ASSESSMENT — ENCOUNTER SYMPTOMS
SORE THROAT: 0
BACK PAIN: 1
TROUBLE SWALLOWING: 0
CONSTIPATION: 0
WHEEZING: 0
EYE DISCHARGE: 0
DIARRHEA: 0
ABDOMINAL PAIN: 0
BLOOD IN STOOL: 0
FACIAL SWELLING: 0
NAUSEA: 0
SINUS PRESSURE: 0
ROS SKIN COMMENTS: NO RASH OR BRUISING
STRIDOR: 0
EYE PAIN: 0
EYE REDNESS: 0
SHORTNESS OF BREATH: 0
CHOKING: 0
COUGH: 0
VOMITING: 0
VOICE CHANGE: 0

## 2022-02-24 NOTE — ED PROVIDER NOTES
stiffness. Thoracic back pain and spasm   Skin: Negative for rash. No rash or bruising   Neurological: Negative for dizziness, seizures, syncope, weakness, light-headedness and headaches. No headache   Hematological: Negative for adenopathy. Does not bruise/bleed easily. Psychiatric/Behavioral: Negative for confusion, sleep disturbance and suicidal ideas. The patient is not nervous/anxious. red and bold elements reviewed    PAST MEDICAL HISTORY         Diagnosis Date    Arthritis     general       SURGICAL HISTORY     Patient  has a past surgical history that includes Hysterectomy (1990); Appendectomy (2014); Cholecystectomy (2002); and Cardiac catheterization (07/07/2003). CURRENT MEDICATIONS       Discharge Medication List as of 2/24/2022  8:59 AM      CONTINUE these medications which have NOT CHANGED    Details   naproxen (NAPROSYN) 500 MG tablet Take 1 tablet by mouth 2 times daily, Disp-60 tablet, R-0Print      CPAP Machine MISC Starting Wed 7/15/2020, Disp-1 each,R-0, PrintPlease change CPAP pressure to 7 cm H20. Respiratory Therapy Supplies (ADULT MASK) MISC Disp-1 each, R-0, PrintPlease do mask desensitization and provide mask of patient's choice. potassium chloride (KLOR-CON) 20 MEQ packet Take 20 mEq by mouth 2 times dailyHistorical Med      bumetanide (BUMEX) 1 MG tablet Take 1 mg by mouth dailyHistorical Med      diltiazem (CARDIZEM CD) 120 MG extended release capsule Take 120 mg by mouth dailyHistorical Med      metoprolol succinate (TOPROL XL) 25 MG extended release tablet Take 25 mg by mouth dailyHistorical Med      aspirin 81 MG tablet Take 81 mg by mouth daily. Historical Med             ALLERGIES     Patient is has No Known Allergies. FAMILY HISTORY     Patient'sfamily history includes Cancer in her mother; Diabetes in her brother and sister; Stroke in her mother. SOCIAL HISTORY     Patient  reports that she has never smoked.  She has never used smokeless tobacco. She reports that she does not drink alcohol and does not use drugs. PHYSICAL EXAM     ED TRIAGE VITALS  BP: (!) 141/71, Temp: 98.5 °F (36.9 °C), Pulse: 81, Resp: 16, SpO2: 100 %  Physical Exam  Vitals and nursing note reviewed. Constitutional:       General: She is not in acute distress. Appearance: She is well-developed. She is not ill-appearing. Comments: Moist membranes, normal airway   HENT:      Head: Normocephalic and atraumatic. Right Ear: External ear normal.      Left Ear: External ear normal.      Nose: Nose normal.      Mouth/Throat:      Pharynx: No oropharyngeal exudate. Comments: Oropharynx normal  Eyes:      General: No scleral icterus. Right eye: No discharge. Left eye: No discharge. Extraocular Movements:      Right eye: Normal extraocular motion. Left eye: Normal extraocular motion. Conjunctiva/sclera: Conjunctivae normal.      Pupils: Pupils are equal, round, and reactive to light. Comments: Conjunctiva clear   Neck:      Thyroid: No thyromegaly. Vascular: No JVD. Cardiovascular:      Rate and Rhythm: Normal rate and regular rhythm. Pulses: Normal pulses. Heart sounds: Normal heart sounds, S1 normal and S2 normal. No murmur heard. No friction rub. No gallop. Comments: No murmur  Pulmonary:      Effort: Pulmonary effort is normal. No tachypnea or respiratory distress. Breath sounds: Normal breath sounds. No stridor. No decreased breath sounds, wheezing, rhonchi or rales. Comments: No cough lungs clear throughout  Chest:      Chest wall: No tenderness. Abdominal:      General: Bowel sounds are normal. There is no distension or abdominal bruit. Palpations: Abdomen is soft. There is no mass. Tenderness: There is no abdominal tenderness. There is no right CVA tenderness, left CVA tenderness, guarding or rebound. Comments: Soft nontender.   No pulsation or bruit   Musculoskeletal: General: Normal range of motion. Cervical back: Normal and normal range of motion. No spinous process tenderness or muscular tenderness. Thoracic back: Spasms and tenderness present. Lumbar back: Normal.        Back:    Lymphadenopathy:      Cervical: No cervical adenopathy. Right cervical: No superficial cervical adenopathy. Left cervical: No superficial cervical adenopathy. Skin:     General: Skin is warm and dry. Findings: No erythema or rash. Comments: No rash or bruising. No zoster   Neurological:      Mental Status: She is alert and oriented to person, place, and time. Cranial Nerves: No cranial nerve deficit. Motor: No abnormal muscle tone. Coordination: Coordination normal.      Deep Tendon Reflexes: Reflexes are normal and symmetric. Reflexes normal.      Comments: Appropriate, normal gait, straight leg raising negative, no cauda equina. Psychiatric:         Behavior: Behavior normal.         Thought Content: Thought content normal.         Judgment: Judgment normal.         DIAGNOSTIC RESULTS   Labs: No results found for this visit on 02/24/22. IMAGING:  No orders to display     URGENT CARE COURSE:     Vitals:    02/24/22 0836   BP: (!) 141/71   Pulse: 81   Resp: 16   Temp: 98.5 °F (36.9 °C)   TempSrc: Temporal   SpO2: 100%       Medications - No data to display  PROCEDURES:  None  FINALIMPRESSION      1. Acute thoracic myofascial strain, initial encounter    2. Spasm of muscle        DISPOSITION/PLAN   DISPOSITION    Nontoxic, well-hydrated, normal airway. No cardiopulmonary etiology for symptoms. No indication for plain radiographs. Patient has thoracic myofascial strain with spasm. No zoster. No infectious process. No neurovascular complication. No GI or  etiology for symptoms.   Will treat with Motrin, Flexeril, Tylenol, heat, rest.  Patient to follow-up at family medicine practice for recheck in 5 days, and she understands to go to ED if worse. PATIENT REFERRED TO:  [facility] [address],[address]  [facility]. [address] [facility]  Schedule an appointment as soon as possible for a visit in 5 days  reCheck in office, go to emergency if worse    DISCHARGE MEDICATIONS:  Discharge Medication List as of 2/24/2022  8:59 AM      START taking these medications    Details   ibuprofen (IBU) 400 MG tablet Take 1 tablet by mouth 3 times daily as needed for Pain (Take with food 3 times daily for pain), Disp-20 tablet, R-0Print      cyclobenzaprine (FLEXERIL) 5 MG tablet Take 1 tablet by mouth 3 times daily as needed for Muscle spasms Caution will cause drowsiness, Disp-15 tablet, R-0Print           Discharge Medication List as of 2/24/2022  8:59 AM          MD Festus Monte MD  02/24/22 0158

## 2022-03-03 ENCOUNTER — OFFICE VISIT (OUTPATIENT)
Dept: FAMILY MEDICINE CLINIC | Age: 82
End: 2022-03-03
Payer: MEDICARE

## 2022-03-03 VITALS
OXYGEN SATURATION: 98 % | HEIGHT: 65 IN | HEART RATE: 76 BPM | DIASTOLIC BLOOD PRESSURE: 70 MMHG | RESPIRATION RATE: 18 BRPM | SYSTOLIC BLOOD PRESSURE: 126 MMHG | TEMPERATURE: 97 F | WEIGHT: 216 LBS | BODY MASS INDEX: 35.99 KG/M2

## 2022-03-03 DIAGNOSIS — N95.1 MENOPAUSAL AND FEMALE CLIMACTERIC STATES: ICD-10-CM

## 2022-03-03 DIAGNOSIS — I10 PRIMARY HYPERTENSION: ICD-10-CM

## 2022-03-03 DIAGNOSIS — M54.6 ACUTE BILATERAL THORACIC BACK PAIN: Primary | ICD-10-CM

## 2022-03-03 PROCEDURE — 1123F ACP DISCUSS/DSCN MKR DOCD: CPT | Performed by: FAMILY MEDICINE

## 2022-03-03 PROCEDURE — 1090F PRES/ABSN URINE INCON ASSESS: CPT | Performed by: FAMILY MEDICINE

## 2022-03-03 PROCEDURE — 1036F TOBACCO NON-USER: CPT | Performed by: FAMILY MEDICINE

## 2022-03-03 PROCEDURE — G8400 PT W/DXA NO RESULTS DOC: HCPCS | Performed by: FAMILY MEDICINE

## 2022-03-03 PROCEDURE — 99204 OFFICE O/P NEW MOD 45 MIN: CPT | Performed by: FAMILY MEDICINE

## 2022-03-03 PROCEDURE — G8417 CALC BMI ABV UP PARAM F/U: HCPCS | Performed by: FAMILY MEDICINE

## 2022-03-03 PROCEDURE — G8427 DOCREV CUR MEDS BY ELIG CLIN: HCPCS | Performed by: FAMILY MEDICINE

## 2022-03-03 PROCEDURE — G8484 FLU IMMUNIZE NO ADMIN: HCPCS | Performed by: FAMILY MEDICINE

## 2022-03-03 PROCEDURE — 4040F PNEUMOC VAC/ADMIN/RCVD: CPT | Performed by: FAMILY MEDICINE

## 2022-03-03 RX ORDER — CYCLOBENZAPRINE HCL 5 MG
5 TABLET ORAL 2 TIMES DAILY PRN
Qty: 5 TABLET | Refills: 0 | Status: SHIPPED | OUTPATIENT
Start: 2022-03-03 | End: 2022-03-03 | Stop reason: SDUPTHER

## 2022-03-03 RX ORDER — POTASSIUM CHLORIDE 20 MEQ/1
TABLET, EXTENDED RELEASE ORAL
COMMUNITY
Start: 2022-02-22 | End: 2022-10-04 | Stop reason: SDUPTHER

## 2022-03-03 RX ORDER — CYCLOBENZAPRINE HCL 5 MG
5 TABLET ORAL 2 TIMES DAILY PRN
Qty: 10 TABLET | Refills: 0 | Status: SHIPPED | OUTPATIENT
Start: 2022-03-03 | End: 2022-03-31 | Stop reason: SDUPTHER

## 2022-03-03 ASSESSMENT — ENCOUNTER SYMPTOMS
SHORTNESS OF BREATH: 0
NAUSEA: 0
ABDOMINAL PAIN: 0
BACK PAIN: 1
SORE THROAT: 0
COUGH: 0

## 2022-03-03 ASSESSMENT — PATIENT HEALTH QUESTIONNAIRE - PHQ9
1. LITTLE INTEREST OR PLEASURE IN DOING THINGS: 0
SUM OF ALL RESPONSES TO PHQ QUESTIONS 1-9: 0
SUM OF ALL RESPONSES TO PHQ9 QUESTIONS 1 & 2: 0
SUM OF ALL RESPONSES TO PHQ QUESTIONS 1-9: 0
2. FEELING DOWN, DEPRESSED OR HOPELESS: 0
SUM OF ALL RESPONSES TO PHQ QUESTIONS 1-9: 0
SUM OF ALL RESPONSES TO PHQ QUESTIONS 1-9: 0

## 2022-03-03 NOTE — PROGRESS NOTES
[address] [address]  Dept: [phone]  Dept Fax: [phone]  Loc: [phone]      Rodriguez Pemberton is a [de-identified] y.o. female who presents todayfor her medical conditions/complaints as noted below. Rodriguez Pemberton is c/o of Back Pain (seen in  2/24/22 )      :     HPI     Ms. Carlos Dunham is here for acute follow-up of back pain only. Does not want to establish care. She was seen in the urgent care on 2/24/2022. Back pain: She was diagnosed with acute thoracic myofascial strain and back spasm at the urgent care on 2/24/2022. She was given scheduled ibuprofen and Flexeril as needed. Her pain started about 3 weeks ago. She noticed the pain initially when she went to stand up. She describes it as achey, but occasionally sharp in nature. She rates her as a 0/10 when sitting, but 7/10 when standing. She recently purchased a treadmill a few weeks ago and has been walking more. Ibuprofen and flexeril helped. She took the flexeril scheduled because she did not realize it was as needed. Pain overall is better but still present. No previous history of back problems. No changes in sensation to distal extremities, range of motion, or strength. Denies urinary or stool incontinence. No other questions or complaints. Patient Active Problem List   Diagnosis    Acute appendicitis with perforation and peritoneal abscess    Hypokalemia    Hyperglycemia    PONV (postoperative nausea and vomiting)    Hypertension     Goals    None       The patient has No Known Allergies. Medical History  Alisson Pierson has a past medical history of Arthritis and Hypertension. Past SurgicalHistory  The patient  has a past surgical history that includes Hysterectomy (1990); Appendectomy (2014); Cholecystectomy (2002);  Cardiac catheterization (07/07/2003); and Hysterectomy, total abdominal.    Family History  This patient's family history includes Cancer in her mother; Diabetes in her brother and sister; Stroke in her mother. Social History  Marilou Malloy  reports that she has never smoked. She has never used smokeless tobacco. She reports that she does not drink alcohol and does not use drugs. Medications    Current Outpatient Medications:     potassium chloride (KLOR-CON M) 20 MEQ extended release tablet, take 1 tablet by mouth once daily, Disp: , Rfl:     cyclobenzaprine (FLEXERIL) 5 MG tablet, Take 1 tablet by mouth 2 times daily as needed for Muscle spasms, Disp: 10 tablet, Rfl: 0    bumetanide (BUMEX) 1 MG tablet, Take 1 mg by mouth daily, Disp: , Rfl:     diltiazem (CARDIZEM CD) 120 MG extended release capsule, Take 120 mg by mouth daily, Disp: , Rfl:     metoprolol succinate (TOPROL XL) 25 MG extended release tablet, Take 25 mg by mouth daily, Disp: , Rfl:     aspirin 81 MG tablet, Take 81 mg by mouth daily. , Disp: , Rfl:     CPAP Machine MISC, by Does not apply route Please change CPAP pressure to 7 cm H20., Disp: 1 each, Rfl: 0    Respiratory Therapy Supplies (ADULT MASK) MISC, Please do mask desensitization and provide mask of patient's choice. , Disp: 1 each, Rfl: 0    Subjective:      Review of Systems   Constitutional: Negative for chills and fever. HENT: Negative for congestion and sore throat. Eyes: Negative for visual disturbance. Respiratory: Negative for cough and shortness of breath. Cardiovascular: Negative for chest pain. Gastrointestinal: Negative for abdominal pain and nausea. Genitourinary: Negative for dysuria. Musculoskeletal: Positive for back pain (improving overall, but still present). Negative for gait problem and neck stiffness. Skin: Negative for rash. Neurological: Negative for dizziness, light-headedness and headaches. Psychiatric/Behavioral: The patient is not nervous/anxious.         Objective:     Vitals:    03/03/22 0837   BP: 126/70   Site: Left Upper Arm   Position: Sitting   Pulse: 76   Resp: 18   Temp: 97 °F (36.1 °C)   TempSrc: Temporal   SpO2: 98%   Weight: 216 lb (98 kg)   Height: 5' 5\" (1.651 m)       Physical Exam  Constitutional:       General: She is not in acute distress. Appearance: Normal appearance. HENT:      Head: Normocephalic. Right Ear: External ear normal.      Left Ear: External ear normal.      Nose: Nose normal.      Mouth/Throat:      Mouth: Mucous membranes are moist.   Eyes:      General: No scleral icterus. Extraocular Movements: Extraocular movements intact. Cardiovascular:      Rate and Rhythm: Normal rate and regular rhythm. Pulses: Normal pulses. Heart sounds: Normal heart sounds. Pulmonary:      Effort: Pulmonary effort is normal.      Breath sounds: Normal breath sounds. Abdominal:      General: Abdomen is flat. There is no distension. Palpations: Abdomen is soft. Musculoskeletal:         General: Tenderness present. Normal range of motion. Cervical back: Normal range of motion. Comments: Tense paraspinal muscles noted T7-L1. Minimal pain with palpation   Skin:     General: Skin is warm and dry. Neurological:      Mental Status: She is alert. Motor: No weakness. Psychiatric:         Mood and Affect: Mood normal.         Lab Results   Component Value Date    WBC 6.7 08/16/2021    HGB 13.0 08/16/2021    HCT 41.6 08/16/2021     08/16/2021    CHOL 170 08/16/2021    TRIG 90 08/16/2021    HDL 58 08/16/2021    ALT 26 08/16/2021    AST 21 08/16/2021     08/16/2021    K 4.3 08/16/2021     08/16/2021    CREATININE 0.7 08/16/2021    BUN 20 08/16/2021    CO2 28 08/16/2021    TSH 0.416 08/11/2020    INR 1.02 04/10/2013    LABA1C 5.4 04/11/2013       /Plan:   1.  Acute bilateral thoracic back pain  Acute, improving  Began 3 weeks ago after she began walking more on the treadmill  Given scheduled ibuprofen and as needed Flexeril by urgent care on 2/24  Pain is overall improving  Provided back stretches  Will give additional Flexeril as needed      2. Menopausal and female climacteric states  Will check DEXA scan. - DEXA BONE DENSITY 2 SITES; Future    3. Primary hypertension  At goal. Continue metoprolol 25 mg and diltiazem 120 mg daily. Return in about 4 weeks (around 3/31/2022) for Annual Wellness and follow-up of back pain. Orders Placed   Orders Placed This Encounter   Procedures    DEXA BONE DENSITY 2 SITES     Standing Status:   Future     Standing Expiration Date:   3/3/2023     Order Specific Question:   Reason for exam:     Answer:   screen osteoporosis       Prescriptions given/sent  Orders Placed This Encounter   Medications    DISCONTD: cyclobenzaprine (FLEXERIL) 5 MG tablet     Sig: Take 1 tablet by mouth 2 times daily as needed for Muscle spasms     Dispense:  5 tablet     Refill:  0    cyclobenzaprine (FLEXERIL) 5 MG tablet     Sig: Take 1 tablet by mouth 2 times daily as needed for Muscle spasms     Dispense:  10 tablet     Refill:  0     Patient given educational materials - see patient instructions. Discussed use, benefit, and side effects of prescribed medications. All patientquestions answered. Pt voiced understanding. Reviewed health maintenance.         Electronically signed by Maulik Monsivais MD on 3/3/2022 at 9:19 AM

## 2022-03-03 NOTE — PROGRESS NOTES
Attending attestation:  I personally performed and participated key or critical portions of the evaluation and management including personally performing the exam and medical decision making. I verify the accuracy of the documentation by the resident with the following addition or changes: Back pain for a couple weeks. No injury or fall but was trying to walk more on treadmill. Was seen at urgent care. 7/10 with standing but 0/10 at rest. Used flexeril and ibuprofen initially. Feeling generally well. Pain is better but still present. No prior history. No changes in sensation or strength. No incontinence. Weight stable. No fevers. Discussed stretches and slowly resuming treadmill. Continue flexeril prn; discussed fall risk and option of swap to less risky agent but no side effects and declined this. Self D/C'ed NSAID after a week; continue off. Will follow. No clear indication for imaging today. Exam reassuring. Blood pressure is at goal; will continue metoprolol 25 mg and diltiazem 120 mg daily. No changes in medications today. Continue to follow with cardiology Dr. Chrissy Hodgson. Last echocardiogram was okay. Re-check for AWV. DEXA ordered. Encouraged vaccines at pharmacy.         Electronically signed by Riley Landeros MD on 3/3/2022 at 9:00 AM

## 2022-03-03 NOTE — PATIENT INSTRUCTIONS
Patient Education        Learning About How to Have a Healthy Back  What causes back pain? Back pain is often caused by overuse, strain, or injury. For example, people often hurt their backs playing sports or working in the yard, being jolted in a car accident, or lifting something too heavy. Aging plays a part too. Your bones and muscles tend to lose strength as you age, which makes injury more likely. The spongy discs between the bones of the spine (vertebrae) may suffer from wear and tear and no longer provide enough cushion between the bones. A disc that bulges or breaks open (herniated disc) can press on nerves, causing back pain. In some people, back pain is the result of arthritis, broken vertebrae caused by bone loss (osteoporosis), illness, or a spine problem. Although most people have back pain at one time or another, there are steps you can take to make it less likely. How can you have a healthy back? Reduce stress on your back through good posture   Slumping or slouching alone may not cause low back pain. But after the back has been strained or injured, bad posture can make pain worse. · Sleep in a position that maintains your back's normal curves and on a mattress that feels comfortable. Sleep on your side with a pillow between your knees, or sleep on your back with a pillow under your knees. These positions can reduce strain on your back. · Stand and sit up straight. \"Good posture\" generally means your ears, shoulders, and hips are in a straight line. · If you must stand for a long time, put one foot on a stool, ledge, or box. Switch feet every now and then. · Sit in a chair that is low enough to let you place both feet flat on the floor with both knees nearly level with your hips. If your chair or desk is too high, use a footrest to raise your knees. Place a small pillow, a rolled-up towel, or a lumbar roll in the curve of your back if you need extra support.   · Try a kneeling chair, which helps tilt your hips forward. This takes pressure off your lower back. · Try sitting on an exercise ball. It can rock from side to side, which helps keep your back loose. · When driving, keep your knees nearly level with your hips. Sit straight, and drive with both hands on the steering wheel. Your arms should be in a slightly bent position. Reduce stress on your back through careful lifting   · Squat down, bending at the hips and knees only. If you need to, put one knee to the floor and extend your other knee in front of you, bent at a right angle (half kneeling). · Press your chest straight forward. This helps keep your upper back straight while keeping a slight arch in your low back. · Hold the load as close to your body as possible, at the level of your belly button (navel). · Use your feet to change direction, taking small steps. · Lead with your hips as you change direction. Keep your shoulders in line with your hips as you move. · Set down your load carefully, squatting with your knees and hips only. Exercise and stretch your back   · Do some exercise on most days of the week, if your doctor says it is okay. You can walk, run, swim, or cycle. · Stretch your back muscles. Here are a few exercises to try:  ? Lie on your back, and gently pull one bent knee to your chest. Put that foot back on the floor, and then pull the other knee to your chest.  ? Do pelvic tilts. Lie on your back with your knees bent. Tighten your stomach muscles. Pull your belly button (navel) in and up toward your ribs. You should feel like your back is pressing to the floor and your hips and pelvis are slightly lifting off the floor. Hold for 6 seconds while breathing smoothly. ? Sit with your back flat against a wall. · Keep your core muscles strong. The muscles of your back, belly (abdomen), and buttocks support your spine. ? Pull in your belly and imagine pulling your navel toward your spine.  Hold this for 6 seconds, then relax. Remember to keep breathing normally as you tense your muscles. ? Do curl-ups. Always do them with your knees bent. Keep your low back on the floor, and curl your shoulders toward your knees using a smooth, slow motion. Keep your arms folded across your chest. If this bothers your neck, try putting your hands behind your neck (not your head), with your elbows spread apart. ? Lie on your back with your knees bent and your feet flat on the floor. Tighten your belly muscles, and then push with your feet and raise your buttocks up a few inches. Hold this position 6 seconds as you continue to breathe normally, then lower yourself slowly to the floor. Repeat 8 to 12 times. ? If you like group exercise, try Pilates or yoga. These classes have poses that strengthen the core muscles. Lead a healthy lifestyle   · Stay at a healthy weight to avoid strain on your back. · Do not smoke. Smoking increases the risk of osteoporosis, which weakens the spine. If you need help quitting, talk to your doctor about stop-smoking programs and medicines. These can increase your chances of quitting for good. Where can you learn more? Go to https://[employer]. org and sign in to your [employer] account. Enter L315 in the [employer] box to learn more about \"Learning About How to Have a Healthy Back. \"     If you do not have an account, please click on the \"Sign Up Now\" link. Current as of: July 1, 2021               Content Version: 13.1  © 2006-2021 Healthwise, Incorporated. Care instructions adapted under license by [facility] ([facility]). If you have questions about a medical condition or this instruction, always ask your healthcare professional. [place] [address] any warranty or liability for your use of this information. Patient Education        Back Stretches: Exercises  Introduction  Here are some examples of exercises for stretching your back. Start each exercise slowly. Ease off the exercise if you start to have pain. Your doctor or physical therapist will tell you when you can start these exercises and which ones will work best for you. How to do the exercises  Overhead stretch    1. Stand comfortably with your feet shoulder-width apart. 2. Looking straight ahead, raise both arms over your head and reach toward the ceiling. Do not allow your head to tilt back. 3. Hold for 15 to 30 seconds, then lower your arms to your sides. 4. Repeat 2 to 4 times. Side stretch    1. Stand comfortably with your feet shoulder-width apart. 2. Raise one arm over your head, and then lean to the other side. 3. Slide your hand down your leg as you let the weight of your arm gently stretch your side muscles. Hold for 15 to 30 seconds. 4. Repeat 2 to 4 times on each side. Press-up    1. Lie on your stomach, supporting your body with your forearms. 2. Press your elbows down into the floor to raise your upper back. As you do this, relax your stomach muscles and allow your back to arch without using your back muscles. As your press up, do not let your hips or pelvis come off the floor. 3. Hold for 15 to 30 seconds, then relax. 4. Repeat 2 to 4 times. Relax and rest    1. Lie on your back with a rolled towel under your neck and a pillow under your knees. Extend your arms comfortably to your sides. 2. Relax and breathe normally. 3. Remain in this position for about 10 minutes. 4. If you can, do this 2 or 3 times each day. Follow-up care is a key part of your treatment and safety. Be sure to make and go to all appointments, and call your doctor if you are having problems. It's also a good idea to know your test results and keep a list of the medicines you take. Where can you learn more? Go to https://chn[employer]oleeb.[employer]. org and sign in to your [employer] account. Enter U646 in the [employer] box to learn more about \"Back Stretches: Exercises. \"     If you do not have an

## 2022-03-24 ENCOUNTER — HOSPITAL ENCOUNTER (OUTPATIENT)
Dept: WOMENS IMAGING | Age: 82
Discharge: HOME | End: 2022-03-24
Payer: MEDICARE

## 2022-03-24 DIAGNOSIS — N95.1 MENOPAUSAL AND FEMALE CLIMACTERIC STATES: ICD-10-CM

## 2022-03-24 PROCEDURE — 77080 DXA BONE DENSITY AXIAL: CPT

## 2022-03-25 ENCOUNTER — TELEPHONE (OUTPATIENT)
Dept: FAMILY MEDICINE CLINIC | Age: 82
End: 2022-03-25

## 2022-03-25 NOTE — TELEPHONE ENCOUNTER
----- Message from Piper Garcia MD sent at 3/25/2022 12:09 AM EDT -----  Patient has osteopenia (mildly low bone density), without osteoporosis. Her risk of major fracture is around 12% over the next decade and her risk of hip fracture is 3%. I would encourage adequate dietary calcium (1200 mg daily) and vitamin D (800 IU daily). Exercise doing something she enjoys for at least 30 minutes 5 days a week. She is right on the border of medications being recommended so we could consider repeating in 2 years. If any questions, I am happy to speak with the patient; we can also discuss further at her follow-up appointment. Schedule for annual well check.

## 2022-03-28 NOTE — TELEPHONE ENCOUNTER
Patient returned call and message was relayed. She voiced understanding and had no further questions. Thank you.

## 2022-03-30 ENCOUNTER — TELEPHONE (OUTPATIENT)
Dept: FAMILY MEDICINE CLINIC | Age: 82
End: 2022-03-30

## 2022-03-30 DIAGNOSIS — M54.6 ACUTE BILATERAL THORACIC BACK PAIN: ICD-10-CM

## 2022-03-30 RX ORDER — CYCLOBENZAPRINE HCL 5 MG
5 TABLET ORAL 2 TIMES DAILY PRN
Qty: 10 TABLET | Refills: 0 | Status: CANCELLED | OUTPATIENT
Start: 2022-03-30

## 2022-03-30 NOTE — TELEPHONE ENCOUNTER
Patient called and would like a refill for cyclobenzaprine 5 mg twice daily. [facility]   Please advise. Thank you.

## 2022-03-31 DIAGNOSIS — M54.6 ACUTE BILATERAL THORACIC BACK PAIN: ICD-10-CM

## 2022-03-31 RX ORDER — CYCLOBENZAPRINE HCL 5 MG
5 TABLET ORAL 2 TIMES DAILY PRN
Qty: 90 TABLET | Refills: 1 | Status: SHIPPED | OUTPATIENT
Start: 2022-03-31 | End: 2022-06-29

## 2022-05-04 VITALS
SYSTOLIC BLOOD PRESSURE: 141 MMHG | OXYGEN SATURATION: 100 % | TEMPERATURE: 98.5 F | HEART RATE: 81 BPM | WEIGHT: 216 LBS | RESPIRATION RATE: 16 BRPM | DIASTOLIC BLOOD PRESSURE: 71 MMHG | HEIGHT: 65 IN | BODY MASS INDEX: 35.99 KG/M2

## 2022-07-19 ENCOUNTER — OFFICE VISIT (OUTPATIENT)
Dept: PULMONOLOGY | Age: 82
End: 2022-07-19
Payer: MEDICARE

## 2022-07-19 VITALS
DIASTOLIC BLOOD PRESSURE: 78 MMHG | HEART RATE: 89 BPM | HEIGHT: 65 IN | WEIGHT: 212 LBS | OXYGEN SATURATION: 98 % | BODY MASS INDEX: 35.32 KG/M2 | SYSTOLIC BLOOD PRESSURE: 124 MMHG | TEMPERATURE: 97.6 F

## 2022-07-19 DIAGNOSIS — E66.9 OBESITY (BMI 30-39.9): ICD-10-CM

## 2022-07-19 DIAGNOSIS — G47.33 OSA (OBSTRUCTIVE SLEEP APNEA): Primary | ICD-10-CM

## 2022-07-19 DIAGNOSIS — G47.09 OTHER INSOMNIA: ICD-10-CM

## 2022-07-19 PROCEDURE — 1036F TOBACCO NON-USER: CPT | Performed by: PHYSICIAN ASSISTANT

## 2022-07-19 PROCEDURE — G8427 DOCREV CUR MEDS BY ELIG CLIN: HCPCS | Performed by: PHYSICIAN ASSISTANT

## 2022-07-19 PROCEDURE — G8417 CALC BMI ABV UP PARAM F/U: HCPCS | Performed by: PHYSICIAN ASSISTANT

## 2022-07-19 PROCEDURE — 99214 OFFICE O/P EST MOD 30 MIN: CPT | Performed by: PHYSICIAN ASSISTANT

## 2022-07-19 PROCEDURE — G8399 PT W/DXA RESULTS DOCUMENT: HCPCS | Performed by: PHYSICIAN ASSISTANT

## 2022-07-19 PROCEDURE — 1090F PRES/ABSN URINE INCON ASSESS: CPT | Performed by: PHYSICIAN ASSISTANT

## 2022-07-19 PROCEDURE — 1123F ACP DISCUSS/DSCN MKR DOCD: CPT | Performed by: PHYSICIAN ASSISTANT

## 2022-07-19 RX ORDER — CHOLECALCIFEROL (VITAMIN D3) 10(400)/ML
DROPS ORAL
COMMUNITY

## 2022-07-19 ASSESSMENT — ENCOUNTER SYMPTOMS
ALLERGIC/IMMUNOLOGIC NEGATIVE: 1
COUGH: 0
CHEST TIGHTNESS: 0
STRIDOR: 0
NAUSEA: 0
DIARRHEA: 0
BACK PAIN: 0
EYES NEGATIVE: 1
SHORTNESS OF BREATH: 0
WHEEZING: 0

## 2022-07-19 NOTE — PROGRESS NOTES
[facility]      Rhiannon Prado         [id number]  7/19/2022   Chief Complaint   Patient presents with    Follow-up     1 year MAMI follow up         Pt of Dr. Fabian ADAIR Download:   Original or initial AHI: 68     Date of initial study: 10/18/2018      Compliant  97%     Noncompliant 3 %     PAP Type Cpap   Level  7 cmH2o    Avg Hrs/Day 5 hours 12 minutes   AHI: 3.9   Recorded compliance dates , 06/18/2022 to 07/17/2022  Machine/Mfg:   [x] ResMed    [] Respironics/Dreamstation   Interface:   [] Nasal    [x] Nasal pillows   [] FFM      Provider:      [] -[facility]     []Jack     [] Marisa    [x] Corene Landau    [] Otf               [] [facility]      [] Adaptive    [] [address]      [] Other    Neck Size: 18.75  Mallampati Mallampati 3  ESS:  7  SAQLI: 83    Here is a scan of the most recent download:            Presentation:   Kellen Rabago presents for sleep medicine follow up for obstructive sleep apnea  Since the last visit, Kellen Rabago is doing well with PAP. She is still having some insomnia. She is wakes through the night about 3-4 times. She struggles to go back to sleep. She feels rested. Equipment issues: The pressure is  acceptable, the mask is acceptable     Sleep issues:  Do you feel better? Yes  More rested? Yes   Better concentration? yes    Progress History:   Since last visit any new medical issues? No  New ER or hospital visits? No  Any new or changes in medicines? No  Any new sleep medicines? No    Review of Systems -   Review of Systems   Constitutional:  Negative for activity change, appetite change, chills and fever. HENT:  Negative for congestion and postnasal drip. Eyes: Negative. Respiratory:  Negative for cough, chest tightness, shortness of breath, wheezing and stridor. Cardiovascular:  Negative for chest pain and leg swelling. Gastrointestinal:  Negative for diarrhea and nausea. Endocrine: Negative. Genitourinary: Negative. Musculoskeletal: Negative. Negative for arthralgias and back pain. Skin: Negative. Allergic/Immunologic: Negative. Neurological: Negative. Negative for dizziness and light-headedness. Psychiatric/Behavioral: Negative. All other systems reviewed and are negative. Physical Exam:    BMI:  Body mass index is 35.28 kg/m². Wt Readings from Last 3 Encounters:   07/19/22 212 lb (96.2 kg)   05/04/22 216 lb (98 kg)   03/03/22 216 lb (98 kg)     Weight stable / unchanged  Vitals: /78   Pulse 89   Temp 97.6 °F (36.4 °C)   Ht 5' 5\" (1.651 m)   Wt 212 lb (96.2 kg)   SpO2 98% Comment: r/a  BMI 35.28 kg/m²       Physical Exam  Constitutional:       Appearance: Normal appearance. She is normal weight. HENT:      Head: Normocephalic and atraumatic. Right Ear: External ear normal.      Left Ear: External ear normal.      Nose: Nose normal.   Eyes:      Extraocular Movements: Extraocular movements intact. Conjunctiva/sclera: Conjunctivae normal.      Pupils: Pupils are equal, round, and reactive to light. Pulmonary:      Effort: Pulmonary effort is normal.   Musculoskeletal:      Cervical back: Normal range of motion and neck supple. Neurological:      General: No focal deficit present. Mental Status: She is alert and oriented to person, place, and time. Psychiatric:         Attention and Perception: Attention and perception normal.         Mood and Affect: Mood and affect normal.         Speech: Speech normal.         Behavior: Behavior normal. Behavior is cooperative. Thought Content: Thought content normal.         Cognition and Memory: Cognition normal.         Judgment: Judgment normal.         ASSESSMENT/DIAGNOSIS     Diagnosis Orders   1. MAMI (obstructive sleep apnea)        2. Obesity (BMI 30-39. 9)                 Plan   Do you need any equipment today?  Yes update supplies  - discussed treating insomnia and she declines at this point  - Download reviewed and discussed with patient  - She  was advised to continue current positive airway pressure therapy with above described pressure. - She  advised to keep good compliance with current recommended pressure to get optimal results and clinical improvement  - Recommend 7-9 hours of sleep with PAP  - She was advised to call [employer] regarding supplies if needed.   -She call my office for earlier appointment if needed for worsening of sleep symptoms.   - She was instructed on weight loss  - Cornell Conde was educated about my impression and plan. Patient verbalizesunderstanding.   We will see Patito Del Rio back in: 1 year with download    Information added by my medical assistant/LPN was reviewed today         Juana Boo PA-C, MPAS  7/19/2022

## 2022-09-27 ENCOUNTER — HOSPITAL ENCOUNTER (OUTPATIENT)
Age: 82
Discharge: HOME | End: 2022-09-27
Payer: MEDICARE

## 2022-09-27 LAB
ALBUMIN SERPL-MCNC: 4.2 G/DL (ref 3.5–5.1)
ALP BLD-CCNC: 73 U/L (ref 38–126)
ALT SERPL-CCNC: 25 U/L (ref 11–66)
ANION GAP SERPL CALCULATED.3IONS-SCNC: 12 MEQ/L (ref 8–16)
AST SERPL-CCNC: 25 U/L (ref 5–40)
BILIRUB SERPL-MCNC: 0.6 MG/DL (ref 0.3–1.2)
BILIRUBIN DIRECT: < 0.2 MG/DL (ref 0–0.3)
BUN BLDV-MCNC: 12 MG/DL (ref 7–22)
CALCIUM SERPL-MCNC: 9.9 MG/DL (ref 8.5–10.5)
CHLORIDE BLD-SCNC: 101 MEQ/L (ref 98–111)
CHOLESTEROL, TOTAL: 161 MG/DL (ref 100–199)
CO2: 28 MEQ/L (ref 23–33)
CREAT SERPL-MCNC: 0.7 MG/DL (ref 0.4–1.2)
ERYTHROCYTE [DISTWIDTH] IN BLOOD BY AUTOMATED COUNT: 13.5 % (ref 11.5–14.5)
ERYTHROCYTE [DISTWIDTH] IN BLOOD BY AUTOMATED COUNT: 42 FL (ref 35–45)
GFR SERPL CREATININE-BSD FRML MDRD: 80 ML/MIN/1.73M2
GLUCOSE BLD-MCNC: 105 MG/DL (ref 70–108)
HCT VFR BLD CALC: 42.9 % (ref 37–47)
HDLC SERPL-MCNC: 67 MG/DL
HEMOGLOBIN: 13.9 GM/DL (ref 12–16)
LDL CHOLESTEROL CALCULATED: 77 MG/DL
MCH RBC QN AUTO: 28 PG (ref 26–33)
MCHC RBC AUTO-ENTMCNC: 32.4 GM/DL (ref 32.2–35.5)
MCV RBC AUTO: 86.5 FL (ref 81–99)
PLATELET # BLD: 245 THOU/MM3 (ref 130–400)
PMV BLD AUTO: 10.4 FL (ref 9.4–12.4)
POTASSIUM SERPL-SCNC: 3.8 MEQ/L (ref 3.5–5.2)
RBC # BLD: 4.96 MILL/MM3 (ref 4.2–5.4)
SODIUM BLD-SCNC: 141 MEQ/L (ref 135–145)
TOTAL PROTEIN: 6.8 G/DL (ref 6.1–8)
TRIGL SERPL-MCNC: 84 MG/DL (ref 0–199)
WBC # BLD: 6.1 THOU/MM3 (ref 4.8–10.8)

## 2022-09-27 PROCEDURE — 85027 COMPLETE CBC AUTOMATED: CPT

## 2022-09-27 PROCEDURE — 80061 LIPID PANEL: CPT

## 2022-09-27 PROCEDURE — 80053 COMPREHEN METABOLIC PANEL: CPT

## 2022-09-27 PROCEDURE — 36415 COLL VENOUS BLD VENIPUNCTURE: CPT

## 2022-09-27 PROCEDURE — 82248 BILIRUBIN DIRECT: CPT

## 2022-10-04 ENCOUNTER — OFFICE VISIT (OUTPATIENT)
Dept: CARDIOLOGY CLINIC | Age: 82
End: 2022-10-04
Payer: MEDICARE

## 2022-10-04 VITALS
RESPIRATION RATE: 18 BRPM | DIASTOLIC BLOOD PRESSURE: 71 MMHG | SYSTOLIC BLOOD PRESSURE: 130 MMHG | BODY MASS INDEX: 34.82 KG/M2 | WEIGHT: 209 LBS | HEART RATE: 62 BPM | HEIGHT: 65 IN

## 2022-10-04 DIAGNOSIS — I10 PRIMARY HYPERTENSION: Primary | ICD-10-CM

## 2022-10-04 DIAGNOSIS — E78.5 DYSLIPIDEMIA (HIGH LDL; LOW HDL): ICD-10-CM

## 2022-10-04 PROCEDURE — G8484 FLU IMMUNIZE NO ADMIN: HCPCS | Performed by: INTERNAL MEDICINE

## 2022-10-04 PROCEDURE — 1123F ACP DISCUSS/DSCN MKR DOCD: CPT | Performed by: INTERNAL MEDICINE

## 2022-10-04 PROCEDURE — 93000 ELECTROCARDIOGRAM COMPLETE: CPT | Performed by: INTERNAL MEDICINE

## 2022-10-04 PROCEDURE — G8399 PT W/DXA RESULTS DOCUMENT: HCPCS | Performed by: INTERNAL MEDICINE

## 2022-10-04 PROCEDURE — 99213 OFFICE O/P EST LOW 20 MIN: CPT | Performed by: INTERNAL MEDICINE

## 2022-10-04 PROCEDURE — G8417 CALC BMI ABV UP PARAM F/U: HCPCS | Performed by: INTERNAL MEDICINE

## 2022-10-04 PROCEDURE — 1036F TOBACCO NON-USER: CPT | Performed by: INTERNAL MEDICINE

## 2022-10-04 PROCEDURE — 1090F PRES/ABSN URINE INCON ASSESS: CPT | Performed by: INTERNAL MEDICINE

## 2022-10-04 PROCEDURE — G8427 DOCREV CUR MEDS BY ELIG CLIN: HCPCS | Performed by: INTERNAL MEDICINE

## 2022-10-04 RX ORDER — METOPROLOL SUCCINATE 50 MG/1
TABLET, EXTENDED RELEASE ORAL
COMMUNITY
Start: 2022-07-21 | End: 2022-10-04 | Stop reason: SDUPTHER

## 2022-10-04 RX ORDER — POTASSIUM CHLORIDE 20 MEQ/1
TABLET, EXTENDED RELEASE ORAL
Qty: 90 TABLET | Refills: 3 | Status: SHIPPED | OUTPATIENT
Start: 2022-10-04

## 2022-10-04 RX ORDER — BUMETANIDE 1 MG/1
1 TABLET ORAL DAILY
Qty: 90 TABLET | Refills: 3 | Status: SHIPPED | OUTPATIENT
Start: 2022-10-04

## 2022-10-04 RX ORDER — METOPROLOL SUCCINATE 50 MG/1
TABLET, EXTENDED RELEASE ORAL
Qty: 90 TABLET | Refills: 3 | Status: SHIPPED | OUTPATIENT
Start: 2022-10-04

## 2022-10-04 RX ORDER — DILTIAZEM HYDROCHLORIDE 120 MG/1
120 CAPSULE, COATED, EXTENDED RELEASE ORAL DAILY
Qty: 90 CAPSULE | Refills: 3 | Status: SHIPPED | OUTPATIENT
Start: 2022-10-04

## 2022-10-04 ASSESSMENT — ENCOUNTER SYMPTOMS
APNEA: 0
CHOKING: 0
VOMITING: 0
TROUBLE SWALLOWING: 0
VOICE CHANGE: 0
NAUSEA: 0
CHEST TIGHTNESS: 0
ABDOMINAL PAIN: 0
SHORTNESS OF BREATH: 0
ANAL BLEEDING: 0
WHEEZING: 0
COLOR CHANGE: 0
ABDOMINAL DISTENTION: 0
COUGH: 0
BLOOD IN STOOL: 0
STRIDOR: 0

## 2022-10-04 NOTE — PROGRESS NOTES
[address] [facility]  [place] [address]  Dept: [phone]  Loc: [phone]     10/4/2022       Loi Alonso is here today for   Chief Complaint   Patient presents with    Follow-up           Referring Physician:  No ref. provider found     Patient Active Problem List   Diagnosis    Acute appendicitis with perforation and peritoneal abscess    Hypokalemia    Hyperglycemia    PONV (postoperative nausea and vomiting)    Hypertension       Review of Systems   Constitutional:  Negative for activity change, appetite change, fatigue, fever and unexpected weight change. HENT:  Negative for congestion, trouble swallowing and voice change. Eyes:  Negative for visual disturbance. Respiratory:  Negative for apnea, cough, choking, chest tightness, shortness of breath, wheezing and stridor. Cardiovascular:  Negative for chest pain, palpitations and leg swelling. Gastrointestinal:  Negative for abdominal distention, abdominal pain, anal bleeding, blood in stool, nausea and vomiting. Endocrine: Negative for cold intolerance and heat intolerance. Genitourinary:  Negative for hematuria. Musculoskeletal:  Negative for arthralgias, gait problem, joint swelling and myalgias. Skin:  Negative for color change and rash. Allergic/Immunologic: Negative for environmental allergies and food allergies. Neurological:  Negative for dizziness, tremors, syncope, facial asymmetry, weakness, light-headedness, numbness and headaches. Hematological:  Does not bruise/bleed easily. Psychiatric/Behavioral:  Negative for agitation, behavioral problems and sleep disturbance.        Past Medical History:   Diagnosis Date    Arthritis     general    Hypertension        No Known Allergies    Current Outpatient Medications   Medication Sig Dispense Refill    potassium chloride (KLOR-CON M) 20 MEQ extended release tablet take 1 tablet by mouth once daily 90 tablet 3    dilTIAZem (CARDIZEM CD) 120 MG extended release capsule Take 1 capsule by mouth daily 90 capsule 3    bumetanide (BUMEX) 1 MG tablet Take 1 tablet by mouth daily 90 tablet 3    metoprolol succinate (TOPROL XL) 50 MG extended release tablet take 1 tablet by mouth once daily 90 tablet 3    Cholecalciferol (VITAMIN D) 10 MCG/ML LIQD Take by mouth      CPAP Machine MISC by Does not apply route Please change CPAP pressure to 7 cm H20. 1 each 0    Respiratory Therapy Supplies (ADULT MASK) MISC Please do mask desensitization and provide mask of patient's choice. 1 each 0    aspirin 81 MG tablet Take 81 mg by mouth daily. No current facility-administered medications for this visit. Social History     Socioeconomic History    Marital status:      Spouse name: None    Number of children: None    Years of education: None    Highest education level: None   Tobacco Use    Smoking status: Never    Smokeless tobacco: Never   Substance and Sexual Activity    Alcohol use: No    Drug use: No       Family History   Problem Relation Age of Onset    Cancer Mother     Stroke Mother     Diabetes Sister     Diabetes Brother     Heart Disease Neg Hx     High Blood Pressure Neg Hx        Blood pressure 130/71, pulse 62, resp. rate 18, height 5' 5\" (1.651 m), weight 209 lb (94.8 kg).     Physical Exam:    General Appearance: alert and oriented to person, place and time, in no acute distress  Cardiovascular: normal rate, regular rhythm, normal S1 and S2, no murmurs, rubs, clicks, or gallops, distal pulses intact, no carotid bruits, no JVD  Pulmonary/Chest: clear to auscultation bilaterally- no wheezes, rales or rhonchi, normal air movement, no respiratory distress  Abdomen: soft, non-tender, non-distended, normal bowel sounds, no masses   Extremities: no cyanosis, clubbing or edema, pulse   Skin: warm and dry, no rash or erythema  Head: normocephalic and atraumatic  Eyes: pupils equal, round, and reactive to light  Neck: supple and non-tender without mass, no thyromegaly   Musculoskeletal: normal range of motion, no joint swelling, deformity or tenderness  Neurological: alert, oriented, normal speech, no focal findings or movement disorder noted    Lab Data:    Cardiac Enzymes:  No results for input(s): CKTOTAL, CKMB, CKMBINDEX, TROPONINI in the last 72 hours. CBC:   Lab Results   Component Value Date/Time    WBC 6.1 09/27/2022 09:55 AM    RBC 4.96 09/27/2022 09:55 AM    HGB 13.9 09/27/2022 09:55 AM    HCT 42.9 09/27/2022 09:55 AM     09/27/2022 09:55 AM       CMP:    Lab Results   Component Value Date/Time     09/27/2022 09:55 AM    K 3.8 09/27/2022 09:55 AM     09/27/2022 09:55 AM    CO2 28 09/27/2022 09:55 AM    BUN 12 09/27/2022 09:55 AM    CREATININE 0.7 09/27/2022 09:55 AM    LABGLOM 80 09/27/2022 09:55 AM    GLUCOSE 105 09/27/2022 09:55 AM    CALCIUM 9.9 09/27/2022 09:55 AM       Hepatic Function Panel:    Lab Results   Component Value Date/Time    ALKPHOS 73 09/27/2022 09:55 AM    ALT 25 09/27/2022 09:55 AM    AST 25 09/27/2022 09:55 AM    PROT 6.8 09/27/2022 09:55 AM    BILITOT 0.6 09/27/2022 09:55 AM    BILIDIR <0.2 09/27/2022 09:55 AM    LABALBU 4.2 09/27/2022 09:55 AM       Magnesium:  No results found for: MG    PT/INR:    Lab Results   Component Value Date/Time    INR 1.02 04/10/2013 06:43 PM       HgBA1c:    Lab Results   Component Value Date/Time    LABA1C 5.4 04/11/2013 03:40 AM       FLP:    Lab Results   Component Value Date/Time    TRIG 84 09/27/2022 09:55 AM    HDL 67 09/27/2022 09:55 AM    LDLCALC 77 09/27/2022 09:55 AM       TSH:    Lab Results   Component Value Date/Time    TSH 0.416 08/11/2020 12:20 PM        Diagnosis Orders   1. Primary hypertension  [address]553 - [place] ELECTROCARDIOGRAM, COMPLETE    CBC    Basic Metabolic Panel    Lipid Panel    Hepatic Function Panel      2.  Dyslipidemia (high LDL; low HDL)  CBC    Basic Metabolic Panel    Lipid Panel    Hepatic Function Panel           Assessment/Plan patient is an 81years old old has a history of hypertension hyper cholesteremia morbid obesity. Patient has a history of diastolic dysfunction that is ventricular but it has been a small dose of diuretic and been doing well the patient denied chest pain denied palpitation she has been active. She denied PND and orthopnea. She is happy with her lifestyle and her blood work her EKG findings were all discussed with her and her medication were reconciled and called to her pharmacy and patient will be seen annually elevated question answered to her satisfaction she is to seek medical attention should any change in clinical condition and follow-up with family physician long-term care thank  Orders Placed This Encounter   Procedures    CBC     Standing Status:   Future     Standing Expiration Date:   90/7/0155    Basic Metabolic Panel     Standing Status:   Future     Standing Expiration Date:   10/4/2023    Lipid Panel     Standing Status:   Future     Standing Expiration Date:   10/4/2023     Order Specific Question:   Is Patient Fasting?/# of Hours     Answer:   12 hours    Hepatic Function Panel     Standing Status:   Future     Standing Expiration Date:   10/4/2023    [address]491 - [place] ELECTROCARDIOGRAM, COMPLETE       Return in about 1 year (around 10/4/2023) for htn.      Franci Owusu MD

## 2023-01-03 RX ORDER — POTASSIUM CHLORIDE 20 MEQ/1
TABLET, EXTENDED RELEASE ORAL
Qty: 90 TABLET | Refills: 3 | Status: SHIPPED | OUTPATIENT
Start: 2023-01-03

## 2023-01-03 RX ORDER — METOPROLOL SUCCINATE 50 MG/1
TABLET, EXTENDED RELEASE ORAL
Qty: 90 TABLET | Refills: 3 | Status: SHIPPED | OUTPATIENT
Start: 2023-01-03

## 2023-01-03 RX ORDER — BUMETANIDE 1 MG/1
1 TABLET ORAL DAILY
Qty: 90 TABLET | Refills: 3 | Status: SHIPPED | OUTPATIENT
Start: 2023-01-03

## 2023-01-10 RX ORDER — DILTIAZEM HYDROCHLORIDE 120 MG/1
120 CAPSULE, COATED, EXTENDED RELEASE ORAL DAILY
Qty: 90 CAPSULE | Refills: 2 | Status: SHIPPED | OUTPATIENT
Start: 2023-01-10

## 2023-07-17 ENCOUNTER — OFFICE VISIT (OUTPATIENT)
Dept: PULMONOLOGY | Age: 83
End: 2023-07-17
Payer: MEDICARE

## 2023-07-17 VITALS
WEIGHT: 220.2 LBS | HEIGHT: 64 IN | BODY MASS INDEX: 37.59 KG/M2 | HEART RATE: 71 BPM | TEMPERATURE: 98.3 F | SYSTOLIC BLOOD PRESSURE: 118 MMHG | DIASTOLIC BLOOD PRESSURE: 76 MMHG | OXYGEN SATURATION: 98 %

## 2023-07-17 DIAGNOSIS — E66.9 OBESITY (BMI 30-39.9): ICD-10-CM

## 2023-07-17 DIAGNOSIS — G47.09 OTHER INSOMNIA: ICD-10-CM

## 2023-07-17 DIAGNOSIS — G47.33 OSA (OBSTRUCTIVE SLEEP APNEA): Primary | ICD-10-CM

## 2023-07-17 PROCEDURE — 99214 OFFICE O/P EST MOD 30 MIN: CPT | Performed by: PHYSICIAN ASSISTANT

## 2023-07-17 PROCEDURE — 1090F PRES/ABSN URINE INCON ASSESS: CPT | Performed by: PHYSICIAN ASSISTANT

## 2023-07-17 PROCEDURE — 3074F SYST BP LT 130 MM HG: CPT | Performed by: PHYSICIAN ASSISTANT

## 2023-07-17 PROCEDURE — G8399 PT W/DXA RESULTS DOCUMENT: HCPCS | Performed by: PHYSICIAN ASSISTANT

## 2023-07-17 PROCEDURE — 3078F DIAST BP <80 MM HG: CPT | Performed by: PHYSICIAN ASSISTANT

## 2023-07-17 PROCEDURE — 1036F TOBACCO NON-USER: CPT | Performed by: PHYSICIAN ASSISTANT

## 2023-07-17 PROCEDURE — G8427 DOCREV CUR MEDS BY ELIG CLIN: HCPCS | Performed by: PHYSICIAN ASSISTANT

## 2023-07-17 PROCEDURE — 1123F ACP DISCUSS/DSCN MKR DOCD: CPT | Performed by: PHYSICIAN ASSISTANT

## 2023-07-17 PROCEDURE — G8417 CALC BMI ABV UP PARAM F/U: HCPCS | Performed by: PHYSICIAN ASSISTANT

## 2023-07-17 ASSESSMENT — ENCOUNTER SYMPTOMS
NAUSEA: 0
ALLERGIC/IMMUNOLOGIC NEGATIVE: 1
DIARRHEA: 0
EYES NEGATIVE: 1
COUGH: 0
SHORTNESS OF BREATH: 0
BACK PAIN: 0
STRIDOR: 0
CHEST TIGHTNESS: 0
WHEEZING: 0

## 2023-07-17 NOTE — PROGRESS NOTES
[facility]      Javid Johnson         [id number]  7/17/2023   Chief Complaint   Patient presents with    Follow-up     1 year jean        Pt of Dr. Saul Carrera     PAP Download:   Debbie Valdez or initial AHI: 68     Date of initial study: 10/18/2018        Compliant  100%     Noncompliant 0 %     PAP Type cpap Level  7   Avg Hrs/Day 5.5  AHI: 5.4   Recorded compliance dates 489294-515002  Machine/Mfg:   [x] ResMed    [] Respironics/Dreamstation   Interface:   [] Nasal    [x] Nasal pillows   [] FFM      Provider:      [] SR-HME     [][employer]pria     [] Dasco    [x] Thalia Pilar    [] Schwiet[employer]ans               [] [employer]R [employer]      [] Adaptive    [] [address] [facility] [address]:      [] Other    Neck Size: 18.75  Mallampati 3  ESS:  8  SAQLI: 74    Here is a scan of the most recent download:            Presentation:   Cesar Martinez presents for sleep medicine follow up for obstructive sleep apnea  Since the last visit, Cesar Martinez is doing well with PAP. She continues to wake up frequently at night, 3-4 times. She takes occ power naps during the day with benefit. She has declined treatment for insomnia in the past. AHI mildly elevated but mostly unknown events. Equipment issues: The pressure is  acceptable, the mask is acceptable     Sleep issues:  Do you feel better? Yes  More rested? Yes   Better concentration? yes    Progress History:   Since last visit any new medical issues? No  New ER or hospital visits? No  Any new or changes in medicines? No  Any new sleep medicines? No    Review of Systems -   Review of Systems   Constitutional:  Negative for activity change, appetite change, chills and fever. HENT:  Negative for congestion and postnasal drip. Eyes: Negative. Respiratory:  Negative for cough, chest tightness, shortness of breath, wheezing and stridor. Cardiovascular:  Negative for chest pain and leg swelling. Gastrointestinal:  Negative for diarrhea and nausea. Endocrine: Negative.     Genitourinary:

## 2023-08-02 ENCOUNTER — TELEPHONE (OUTPATIENT)
Dept: CARDIOLOGY CLINIC | Age: 83
End: 2023-08-02

## 2023-08-02 DIAGNOSIS — R94.31 ABNORMAL ELECTROCARDIOGRAM: Primary | ICD-10-CM

## 2023-08-02 NOTE — TELEPHONE ENCOUNTER
Francis Ramirez called in today her b/p has been elevated 8/1 am 148/80 73 hr after meds 126/75. She is getting dizzy and not motivation. She has no PCP. Per Dr Osborn Slight needs to hav a stress test, some lab work and decrease her metoprolol. Stress test 8/17/23 @ 1:30 pm. Called to Francis Ramirez. New  script for metoprolol 25 mg qd sent to [facility] She has enough 50 mg she can cut in half.

## 2023-08-08 ENCOUNTER — OFFICE VISIT (OUTPATIENT)
Dept: FAMILY MEDICINE CLINIC | Age: 83
End: 2023-08-08
Payer: MEDICARE

## 2023-08-08 VITALS
HEIGHT: 64 IN | BODY MASS INDEX: 36.54 KG/M2 | DIASTOLIC BLOOD PRESSURE: 82 MMHG | WEIGHT: 214 LBS | HEART RATE: 101 BPM | OXYGEN SATURATION: 96 % | SYSTOLIC BLOOD PRESSURE: 138 MMHG

## 2023-08-08 DIAGNOSIS — R00.0 TACHYCARDIA: ICD-10-CM

## 2023-08-08 DIAGNOSIS — E78.5 DYSLIPIDEMIA (HIGH LDL; LOW HDL): ICD-10-CM

## 2023-08-08 DIAGNOSIS — R42 DIZZINESS: Primary | ICD-10-CM

## 2023-08-08 DIAGNOSIS — I10 PRIMARY HYPERTENSION: ICD-10-CM

## 2023-08-08 LAB
ALBUMIN SERPL BCG-MCNC: 4.2 G/DL (ref 3.5–5.1)
ALP SERPL-CCNC: 72 U/L (ref 38–126)
ALT SERPL W/O P-5'-P-CCNC: 26 U/L (ref 11–66)
ANION GAP SERPL CALC-SCNC: 14 MEQ/L (ref 8–16)
AST SERPL-CCNC: 21 U/L (ref 5–40)
BILIRUB CONJ SERPL-MCNC: < 0.2 MG/DL (ref 0–0.3)
BILIRUB SERPL-MCNC: 0.3 MG/DL (ref 0.3–1.2)
BUN SERPL-MCNC: 15 MG/DL (ref 7–22)
CALCIUM SERPL-MCNC: 10 MG/DL (ref 8.5–10.5)
CHLORIDE SERPL-SCNC: 102 MEQ/L (ref 98–111)
CHOLEST SERPL-MCNC: 169 MG/DL (ref 100–199)
CO2 SERPL-SCNC: 28 MEQ/L (ref 23–33)
CREAT SERPL-MCNC: 0.8 MG/DL (ref 0.4–1.2)
DEPRECATED RDW RBC AUTO: 46 FL (ref 35–45)
ERYTHROCYTE [DISTWIDTH] IN BLOOD BY AUTOMATED COUNT: 14.1 % (ref 11.5–14.5)
GFR SERPL CREATININE-BSD FRML MDRD: > 60 ML/MIN/1.73M2
GLUCOSE SERPL-MCNC: 113 MG/DL (ref 70–108)
HCT VFR BLD AUTO: 46.7 % (ref 37–47)
HDLC SERPL-MCNC: 56 MG/DL
HGB BLD-MCNC: 14.6 GM/DL (ref 12–16)
LDLC SERPL CALC-MCNC: 89 MG/DL
MCH RBC QN AUTO: 28.1 PG (ref 26–33)
MCHC RBC AUTO-ENTMCNC: 31.3 GM/DL (ref 32.2–35.5)
MCV RBC AUTO: 89.8 FL (ref 81–99)
PLATELET # BLD AUTO: 246 THOU/MM3 (ref 130–400)
PMV BLD AUTO: 11.7 FL (ref 9.4–12.4)
POTASSIUM SERPL-SCNC: 3.7 MEQ/L (ref 3.5–5.2)
PROT SERPL-MCNC: 7.8 G/DL (ref 6.1–8)
RBC # BLD AUTO: 5.2 MILL/MM3 (ref 4.2–5.4)
SODIUM SERPL-SCNC: 144 MEQ/L (ref 135–145)
TRIGL SERPL-MCNC: 119 MG/DL (ref 0–199)
TSH SERPL DL<=0.005 MIU/L-ACNC: 0.08 UIU/ML (ref 0.4–4.2)
WBC # BLD AUTO: 7.1 THOU/MM3 (ref 4.8–10.8)

## 2023-08-08 PROCEDURE — 1036F TOBACCO NON-USER: CPT | Performed by: STUDENT IN AN ORGANIZED HEALTH CARE EDUCATION/TRAINING PROGRAM

## 2023-08-08 PROCEDURE — 3079F DIAST BP 80-89 MM HG: CPT | Performed by: STUDENT IN AN ORGANIZED HEALTH CARE EDUCATION/TRAINING PROGRAM

## 2023-08-08 PROCEDURE — G8427 DOCREV CUR MEDS BY ELIG CLIN: HCPCS | Performed by: STUDENT IN AN ORGANIZED HEALTH CARE EDUCATION/TRAINING PROGRAM

## 2023-08-08 PROCEDURE — 1123F ACP DISCUSS/DSCN MKR DOCD: CPT | Performed by: STUDENT IN AN ORGANIZED HEALTH CARE EDUCATION/TRAINING PROGRAM

## 2023-08-08 PROCEDURE — 36415 COLL VENOUS BLD VENIPUNCTURE: CPT | Performed by: STUDENT IN AN ORGANIZED HEALTH CARE EDUCATION/TRAINING PROGRAM

## 2023-08-08 PROCEDURE — G8399 PT W/DXA RESULTS DOCUMENT: HCPCS | Performed by: STUDENT IN AN ORGANIZED HEALTH CARE EDUCATION/TRAINING PROGRAM

## 2023-08-08 PROCEDURE — G8417 CALC BMI ABV UP PARAM F/U: HCPCS | Performed by: STUDENT IN AN ORGANIZED HEALTH CARE EDUCATION/TRAINING PROGRAM

## 2023-08-08 PROCEDURE — 3075F SYST BP GE 130 - 139MM HG: CPT | Performed by: STUDENT IN AN ORGANIZED HEALTH CARE EDUCATION/TRAINING PROGRAM

## 2023-08-08 PROCEDURE — 1090F PRES/ABSN URINE INCON ASSESS: CPT | Performed by: STUDENT IN AN ORGANIZED HEALTH CARE EDUCATION/TRAINING PROGRAM

## 2023-08-08 PROCEDURE — 99214 OFFICE O/P EST MOD 30 MIN: CPT | Performed by: STUDENT IN AN ORGANIZED HEALTH CARE EDUCATION/TRAINING PROGRAM

## 2023-08-08 RX ORDER — MECLIZINE HCL 12.5 MG/1
12.5 TABLET ORAL 3 TIMES DAILY PRN
Qty: 10 TABLET | Refills: 0 | Status: SHIPPED | OUTPATIENT
Start: 2023-08-08 | End: 2023-08-18

## 2023-08-08 SDOH — ECONOMIC STABILITY: FOOD INSECURITY: WITHIN THE PAST 12 MONTHS, THE FOOD YOU BOUGHT JUST DIDN'T LAST AND YOU DIDN'T HAVE MONEY TO GET MORE.: NEVER TRUE

## 2023-08-08 SDOH — ECONOMIC STABILITY: INCOME INSECURITY: HOW HARD IS IT FOR YOU TO PAY FOR THE VERY BASICS LIKE FOOD, HOUSING, MEDICAL CARE, AND HEATING?: NOT HARD AT ALL

## 2023-08-08 SDOH — ECONOMIC STABILITY: HOUSING INSECURITY
IN THE LAST 12 MONTHS, WAS THERE A TIME WHEN YOU DID NOT HAVE A STEADY PLACE TO SLEEP OR SLEPT IN A SHELTER (INCLUDING NOW)?: NO

## 2023-08-08 SDOH — ECONOMIC STABILITY: FOOD INSECURITY: WITHIN THE PAST 12 MONTHS, YOU WORRIED THAT YOUR FOOD WOULD RUN OUT BEFORE YOU GOT MONEY TO BUY MORE.: NEVER TRUE

## 2023-08-08 ASSESSMENT — PATIENT HEALTH QUESTIONNAIRE - PHQ9
SUM OF ALL RESPONSES TO PHQ QUESTIONS 1-9: 0
SUM OF ALL RESPONSES TO PHQ QUESTIONS 1-9: 0
1. LITTLE INTEREST OR PLEASURE IN DOING THINGS: 0
SUM OF ALL RESPONSES TO PHQ QUESTIONS 1-9: 0
SUM OF ALL RESPONSES TO PHQ QUESTIONS 1-9: 0
2. FEELING DOWN, DEPRESSED OR HOPELESS: 0
SUM OF ALL RESPONSES TO PHQ9 QUESTIONS 1 & 2: 0

## 2023-08-08 ASSESSMENT — ENCOUNTER SYMPTOMS
SINUS PRESSURE: 0
DIARRHEA: 0
SHORTNESS OF BREATH: 0
COUGH: 0
SORE THROAT: 0
CONSTIPATION: 0
VOMITING: 0
ABDOMINAL PAIN: 0
SINUS PAIN: 0
BLOOD IN STOOL: 0
RHINORRHEA: 0
NAUSEA: 0

## 2023-08-08 NOTE — PROGRESS NOTES
[address] [facility]  [address] [place] [id number]  Dept: [phone]  Loc: Sunshine Orta is a 81 y.o. female who presents today for:  Chief Complaint   Patient presents with    Dizziness     Pt states she feels very dizzy, started couple weeks ago. Notices it more when she's walking or being active. Is worried about falling, always has to hold on to something  No urine issues. Assessment/Plan:     Cesar Martinez was seen today for dizziness. Diagnoses and all orders for this visit:    Dizziness  -     meclizine (ANTIVERT) 12.5 MG tablet; Take 1 tablet by mouth 3 times daily as needed for Dizziness  -     TSH With Reflex Ft4; Future  -     TSH With Reflex Ft4    Tachycardia  -     TSH With Reflex Ft4; Future  -     TSH With Reflex Ft4    Primary hypertension  -     Hepatic Function Panel  -     Lipid Panel  -     Basic Metabolic Panel  -     CBC    Dyslipidemia (high LDL; low HDL)  -     Hepatic Function Panel  -     Lipid Panel  -     Basic Metabolic Panel  -     CBC      Dizziness- unknown cause, likely multifactorial with cardiology involved. Does not appear to be bppv, although can consider referral for PT. Will complete blood work previously ordered by Dr. César Rudolph, added TSH (was on the low side in 2020), was slightly tachycardic. Could be component of dehydration- recommended increased oral hydration at this time. Can consider orthostatics if lab work negative and symptoms continue. OK to trial Antivert but cautioned that side effects could be increased including fatigue. Offered UA, patient unable to give sample at this time. Return if symptoms worsen or fail to improve.       Medications Prescribed:  Orders Placed This Encounter   Medications    meclizine (ANTIVERT) 12.5 MG tablet     Sig: Take 1 tablet by mouth 3 times daily as needed for Dizziness     Dispense:  10 tablet     Refill:  0       Future Appointments

## 2023-08-09 ENCOUNTER — TELEPHONE (OUTPATIENT)
Dept: FAMILY MEDICINE CLINIC | Age: 83
End: 2023-08-09

## 2023-08-09 LAB — T4 FREE SERPL-MCNC: 1.45 NG/DL (ref 0.93–1.76)

## 2023-08-09 NOTE — TELEPHONE ENCOUNTER
Spoke with patient and acknowledged. Annual medicare wellness visit made. Patient stated she would like to continue seeing Dr. Avalos Said.

## 2023-08-10 ENCOUNTER — TELEPHONE (OUTPATIENT)
Dept: FAMILY MEDICINE CLINIC | Age: 83
End: 2023-08-10

## 2023-08-10 DIAGNOSIS — R42 DIZZINESS: Primary | ICD-10-CM

## 2023-08-10 DIAGNOSIS — R00.0 TACHYCARDIA: ICD-10-CM

## 2023-08-10 DIAGNOSIS — R79.89 LOW TSH LEVEL: ICD-10-CM

## 2023-08-10 NOTE — TELEPHONE ENCOUNTER
Please let patient know that I have placed order to have thyroid recheck. Check in 6 weeks, however if dizziness persists/worsened please have her re-check in 2 weeks. Thanks!

## 2023-08-10 NOTE — TELEPHONE ENCOUNTER
"Anesthesia Transfer of Care Note    Patient: Domingo Castro    Procedure(s) Performed: Procedure(s) (LRB):  CREATION, AV FISTULA INSERTION GRAFT, LEFT UPPER FOREARM (Left)    Patient location: [facility]    Anesthesia Type: MAC    Transport from OR: Transported from OR on 2-3 L/min O2 by NC with adequate spontaneous ventilation    Post pain: adequate analgesia    Post assessment: no apparent anesthetic complications and tolerated procedure well    Post vital signs: stable    Level of consciousness: awake and alert    Nausea/Vomiting: no nausea/vomiting    Complications: none    Transfer of care protocol was followed      Last vitals:   Visit Vitals  BP (!) 188/83 (BP Location: Right arm, Patient Position: Lying)   Pulse 67   Temp 36.5 °C (97.7 °F) (Tympanic)   Resp 16   Ht 6' 3" (1.905 m)   Wt 88.5 kg (195 lb)   BMI 24.37 kg/m²     " Message left on patient's voicemail to return call to office.

## 2023-08-17 ENCOUNTER — HOSPITAL ENCOUNTER (OUTPATIENT)
Dept: NON INVASIVE DIAGNOSTICS | Age: 83
Discharge: HOME | End: 2023-08-17
Attending: INTERNAL MEDICINE
Payer: MEDICARE

## 2023-08-17 DIAGNOSIS — R94.31 ABNORMAL ELECTROCARDIOGRAM: ICD-10-CM

## 2023-08-17 LAB
LEFT VENTRICULAR EJECTION FRACTION MODE: NORMAL
LV EF: 77 %

## 2023-08-17 PROCEDURE — 6360000002 HC RX W HCPCS

## 2023-08-17 PROCEDURE — 78452 HT MUSCLE IMAGE SPECT MULT: CPT | Performed by: NUCLEAR MEDICINE

## 2023-08-17 PROCEDURE — 93017 CV STRESS TEST TRACING ONLY: CPT | Performed by: NUCLEAR MEDICINE

## 2023-08-17 PROCEDURE — A9500 TC99M SESTAMIBI: HCPCS | Performed by: INTERNAL MEDICINE

## 2023-08-17 PROCEDURE — 3430000000 HC RX DIAGNOSTIC RADIOPHARMACEUTICAL: Performed by: INTERNAL MEDICINE

## 2023-08-17 RX ORDER — TETRAKIS(2-METHOXYISOBUTYLISOCYANIDE)COPPER(I) TETRAFLUOROBORATE 1 MG/ML
9.3 INJECTION, POWDER, LYOPHILIZED, FOR SOLUTION INTRAVENOUS
Status: COMPLETED | OUTPATIENT
Start: 2023-08-17 | End: 2023-08-17

## 2023-08-17 RX ORDER — TETRAKIS(2-METHOXYISOBUTYLISOCYANIDE)COPPER(I) TETRAFLUOROBORATE 1 MG/ML
32.6 INJECTION, POWDER, LYOPHILIZED, FOR SOLUTION INTRAVENOUS
Status: COMPLETED | OUTPATIENT
Start: 2023-08-17 | End: 2023-08-17

## 2023-08-17 RX ADMIN — Medication 9.3 MILLICURIE: at 12:38

## 2023-08-17 RX ADMIN — Medication 32.6 MILLICURIE: at 13:49

## 2023-08-21 RX ORDER — DILTIAZEM HYDROCHLORIDE 120 MG/1
CAPSULE, COATED, EXTENDED RELEASE ORAL
Qty: 90 CAPSULE | Refills: 0 | Status: SHIPPED | OUTPATIENT
Start: 2023-08-21

## 2023-08-23 ENCOUNTER — NURSE ONLY (OUTPATIENT)
Dept: FAMILY MEDICINE CLINIC | Age: 83
End: 2023-08-23
Payer: MEDICARE

## 2023-08-23 DIAGNOSIS — R00.0 TACHYCARDIA: ICD-10-CM

## 2023-08-23 DIAGNOSIS — R42 DIZZINESS: ICD-10-CM

## 2023-08-23 DIAGNOSIS — R79.89 LOW TSH LEVEL: ICD-10-CM

## 2023-08-23 LAB
T3 TOTAL: 124 NG/DL (ref 60–181)
T4 FREE SERPL-MCNC: 1.15 NG/DL (ref 0.93–1.76)
TSH SERPL DL<=0.005 MIU/L-ACNC: 0.25 UIU/ML (ref 0.4–4.2)

## 2023-08-23 PROCEDURE — 36415 COLL VENOUS BLD VENIPUNCTURE: CPT | Performed by: STUDENT IN AN ORGANIZED HEALTH CARE EDUCATION/TRAINING PROGRAM

## 2023-08-25 ENCOUNTER — TELEPHONE (OUTPATIENT)
Dept: FAMILY MEDICINE CLINIC | Age: 83
End: 2023-08-25

## 2023-08-25 LAB — TSI SER-ACNC: < 0.1 IU/L

## 2023-08-25 NOTE — TELEPHONE ENCOUNTER
----- Message from Henny Johnson MD sent at 8/25/2023  8:06 AM EDT -----  Please let patient know that thyroid levels were normal but thyroid stimulating hormone was still low. It has improved from 2 weeks ago. I would recommend we re-check it in a couple of months, I would not start medication at this time.

## 2023-10-10 ENCOUNTER — OFFICE VISIT (OUTPATIENT)
Dept: FAMILY MEDICINE CLINIC | Age: 83
End: 2023-10-10

## 2023-10-10 VITALS
HEART RATE: 64 BPM | OXYGEN SATURATION: 99 % | RESPIRATION RATE: 15 BRPM | SYSTOLIC BLOOD PRESSURE: 134 MMHG | WEIGHT: 216 LBS | HEIGHT: 64 IN | DIASTOLIC BLOOD PRESSURE: 64 MMHG | BODY MASS INDEX: 36.88 KG/M2

## 2023-10-10 DIAGNOSIS — R79.89 LOW TSH LEVEL: ICD-10-CM

## 2023-10-10 DIAGNOSIS — M25.552 PAIN OF LEFT HIP: ICD-10-CM

## 2023-10-10 RX ORDER — ACETAMINOPHEN 500 MG
500 TABLET ORAL 4 TIMES DAILY PRN
Qty: 360 TABLET | Refills: 1 | Status: SHIPPED | OUTPATIENT
Start: 2023-10-10

## 2023-10-10 ASSESSMENT — ENCOUNTER SYMPTOMS
SHORTNESS OF BREATH: 0
CONSTIPATION: 0
VOMITING: 0
NAUSEA: 0
BACK PAIN: 0
COUGH: 0
DIARRHEA: 0

## 2023-10-10 NOTE — PROGRESS NOTES
[address] [facility]  [address] [place] [address]  Dept: [phone]  Loc: Sunshine Chambers is a 81 y.o. female who presents today for:  Chief Complaint   Patient presents with    Hip Pain     Left hip pain stated Friday. Achy pain that radiates down leg. Unsure of injury. Has tried ibuprofen and icy hot but didn't help        Assessment/Plan:     Jenn Etienne was seen today for hip pain. Diagnoses and all orders for this visit:    Pain of left hip  -     acetaminophen (TYLENOL) 500 MG tablet; Take 1 tablet by mouth 4 times daily as needed for Pain  -     diclofenac sodium (VOLTAREN) 1 % GEL; Apply 4 g topically 4 times daily    Low TSH level  -     TSH; Future  -     T4, Free; Future      Based on exam pain is most likely due to arthritis, patient declines x-rays at this time. Patient also declined formal PT at this time however did give exercises on handout. We will try Tylenol arthritis as above and Voltaren gel to be applied topically otherwise gave handout on arthritis pain. We will repeat thyroid levels prior to next visit. Return in about 3 months (around 1/10/2024) for AWV. Medications Prescribed:  Orders Placed This Encounter   Medications    acetaminophen (TYLENOL) 500 MG tablet     Sig: Take 1 tablet by mouth 4 times daily as needed for Pain     Dispense:  360 tablet     Refill:  1    diclofenac sodium (VOLTAREN) 1 % GEL     Sig: Apply 4 g topically 4 times daily     Dispense:  350 g     Refill:  0       Future Appointments   Date Time Provider [facility] [address]   11/6/2023  1:15 PM Brad Lopez MD [facility] NW CARD [facility] [place] [facility]   1/9/2024  9:00 AM Prince Blancas MD SR[facility] BLUFF P - [place]   7/16/2024 10:15 AM Patti Crawford, ANURAG N [facility] [place] [facility]       HPI:     HPI  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 No injuries that she knows of. Cleans [employer] and hasn't had issue before now.      Has never used

## 2023-10-25 RX ORDER — BUMETANIDE 1 MG/1
1 TABLET ORAL DAILY
Qty: 90 TABLET | Refills: 0 | Status: SHIPPED | OUTPATIENT
Start: 2023-10-25

## 2023-10-25 RX ORDER — METOPROLOL SUCCINATE 50 MG/1
TABLET, EXTENDED RELEASE ORAL
Qty: 90 TABLET | Refills: 0 | Status: SHIPPED | OUTPATIENT
Start: 2023-10-25

## 2023-11-06 ENCOUNTER — OFFICE VISIT (OUTPATIENT)
Dept: CARDIOLOGY CLINIC | Age: 83
End: 2023-11-06
Payer: MEDICARE

## 2023-11-06 VITALS
HEART RATE: 78 BPM | DIASTOLIC BLOOD PRESSURE: 73 MMHG | BODY MASS INDEX: 36.54 KG/M2 | RESPIRATION RATE: 18 BRPM | HEIGHT: 64 IN | SYSTOLIC BLOOD PRESSURE: 139 MMHG | WEIGHT: 214 LBS

## 2023-11-06 DIAGNOSIS — E78.5 HYPERLIPIDEMIA LDL GOAL <130: ICD-10-CM

## 2023-11-06 DIAGNOSIS — I50.32 CHRONIC DIASTOLIC CHF (CONGESTIVE HEART FAILURE), NYHA CLASS 2 (HCC): Primary | ICD-10-CM

## 2023-11-06 DIAGNOSIS — I10 PRIMARY HYPERTENSION: ICD-10-CM

## 2023-11-06 PROCEDURE — 1123F ACP DISCUSS/DSCN MKR DOCD: CPT | Performed by: INTERNAL MEDICINE

## 2023-11-06 PROCEDURE — 3078F DIAST BP <80 MM HG: CPT | Performed by: INTERNAL MEDICINE

## 2023-11-06 PROCEDURE — G8417 CALC BMI ABV UP PARAM F/U: HCPCS | Performed by: INTERNAL MEDICINE

## 2023-11-06 PROCEDURE — 3075F SYST BP GE 130 - 139MM HG: CPT | Performed by: INTERNAL MEDICINE

## 2023-11-06 PROCEDURE — G8484 FLU IMMUNIZE NO ADMIN: HCPCS | Performed by: INTERNAL MEDICINE

## 2023-11-06 PROCEDURE — G8399 PT W/DXA RESULTS DOCUMENT: HCPCS | Performed by: INTERNAL MEDICINE

## 2023-11-06 PROCEDURE — 1090F PRES/ABSN URINE INCON ASSESS: CPT | Performed by: INTERNAL MEDICINE

## 2023-11-06 PROCEDURE — 1036F TOBACCO NON-USER: CPT | Performed by: INTERNAL MEDICINE

## 2023-11-06 PROCEDURE — 99214 OFFICE O/P EST MOD 30 MIN: CPT | Performed by: INTERNAL MEDICINE

## 2023-11-06 PROCEDURE — G8427 DOCREV CUR MEDS BY ELIG CLIN: HCPCS | Performed by: INTERNAL MEDICINE

## 2023-11-06 PROCEDURE — 93000 ELECTROCARDIOGRAM COMPLETE: CPT | Performed by: INTERNAL MEDICINE

## 2023-11-06 RX ORDER — DILTIAZEM HYDROCHLORIDE 120 MG/1
120 CAPSULE, COATED, EXTENDED RELEASE ORAL DAILY
Qty: 90 CAPSULE | Refills: 4 | Status: SHIPPED | OUTPATIENT
Start: 2023-11-06

## 2023-11-06 RX ORDER — POTASSIUM CHLORIDE 20 MEQ/1
TABLET, EXTENDED RELEASE ORAL
Qty: 90 TABLET | Refills: 4 | Status: SHIPPED | OUTPATIENT
Start: 2023-11-06

## 2023-11-06 RX ORDER — METOPROLOL SUCCINATE 50 MG/1
TABLET, EXTENDED RELEASE ORAL
Qty: 90 TABLET | Refills: 4 | Status: SHIPPED | OUTPATIENT
Start: 2023-11-06

## 2023-11-06 RX ORDER — BUMETANIDE 1 MG/1
1 TABLET ORAL DAILY
Qty: 90 TABLET | Refills: 4 | Status: SHIPPED | OUTPATIENT
Start: 2023-11-06

## 2023-11-06 ASSESSMENT — ENCOUNTER SYMPTOMS
RESPIRATORY NEGATIVE: 1
GASTROINTESTINAL NEGATIVE: 1

## 2023-11-06 NOTE — PROGRESS NOTES
[address] [facility] [address]  Dept: [address]: [phone]           Chief Complaint   Patient presents with    Palpitations       Cardiologist:  Dr. Deny Kitchen      Today's visit: 11/6/2023    Subjective:    Review of Systems   Constitutional: Negative. Respiratory: Negative. Cardiovascular: Negative. Gastrointestinal: Negative. Musculoskeletal: Negative. Neurological: Negative. Psychiatric/Behavioral: Negative. Past Medical History:   Diagnosis Date    Arthritis     general    Hypertension        No Known Allergies    Current Outpatient Medications   Medication Sig Dispense Refill    bumetanide (BUMEX) 1 MG tablet Take 1 tablet by mouth daily 90 tablet 4    metoprolol succinate (TOPROL XL) 50 MG extended release tablet TAKE 1 TABLET EVERY DAY 90 tablet 4    dilTIAZem (CARDIZEM CD) 120 MG extended release capsule Take 1 capsule by mouth daily 90 capsule 4    potassium chloride (KLOR-CON M) 20 MEQ extended release tablet take 1 tablet by mouth once daily 90 tablet 4    acetaminophen (TYLENOL) 500 MG tablet Take 1 tablet by mouth 4 times daily as needed for Pain 360 tablet 1    diclofenac sodium (VOLTAREN) 1 % GEL Apply 4 g topically 4 times daily 350 g 0    Cholecalciferol (VITAMIN D) 10 MCG/ML LIQD Take by mouth      CPAP Machine MISC by Does not apply route Please change CPAP pressure to 7 cm H20. 1 each 0    Respiratory Therapy Supplies (ADULT MASK) MISC Please do mask desensitization and provide mask of patient's choice. 1 each 0    aspirin 81 MG tablet Take 1 tablet by mouth daily       No current facility-administered medications for this visit. Social History     Socioeconomic History    Marital status:       Spouse name: None    Number of children: None    Years of education: None    Highest education level: None   Tobacco Use    Smoking status:

## 2023-11-15 ENCOUNTER — TELEPHONE (OUTPATIENT)
Dept: FAMILY MEDICINE CLINIC | Age: 83
End: 2023-11-15

## 2023-11-15 DIAGNOSIS — M25.552 PAIN OF LEFT HIP: Primary | ICD-10-CM

## 2023-11-15 RX ORDER — MELOXICAM 7.5 MG/1
7.5 TABLET ORAL DAILY PRN
Qty: 30 TABLET | Refills: 0 | Status: SHIPPED | OUTPATIENT
Start: 2023-11-15

## 2023-11-15 NOTE — TELEPHONE ENCOUNTER
Pt would like a referral to [facility] and prescription for NSAID sent to [facility] in [place].

## 2023-11-15 NOTE — TELEPHONE ENCOUNTER
Pts daughter called stating that pt was seen on 10/11 for hip pain and was prescribed tylenol and Voltaren gel. Daughter states that medication is not helping and is asking if anything else can be prescribed? Notified daughter that pt refused PT and x-rays, provider can prescribe NSAIDS. Daughter states that pt had tried Advil OTC and does not understand what PT and and x-ray can do. Stated she would call mother to see what she would like to do.

## 2024-01-03 ENCOUNTER — NURSE ONLY (OUTPATIENT)
Dept: FAMILY MEDICINE CLINIC | Age: 84
End: 2024-01-03
Payer: MEDICARE

## 2024-01-03 DIAGNOSIS — R53.83 OTHER FATIGUE: ICD-10-CM

## 2024-01-03 DIAGNOSIS — I10 PRIMARY HYPERTENSION: Primary | ICD-10-CM

## 2024-01-03 DIAGNOSIS — R79.89 LOW TSH LEVEL: ICD-10-CM

## 2024-01-03 LAB
T4 FREE SERPL-MCNC: 1.15 NG/DL (ref 0.93–1.76)
TSH SERPL DL<=0.005 MIU/L-ACNC: 0.42 UIU/ML (ref 0.4–4.2)

## 2024-01-03 PROCEDURE — 36415 COLL VENOUS BLD VENIPUNCTURE: CPT | Performed by: STUDENT IN AN ORGANIZED HEALTH CARE EDUCATION/TRAINING PROGRAM

## 2024-07-23 ENCOUNTER — OFFICE VISIT (OUTPATIENT)
Dept: PULMONOLOGY | Age: 84
End: 2024-07-23
Payer: MEDICARE

## 2024-07-23 VITALS
OXYGEN SATURATION: 99 % | SYSTOLIC BLOOD PRESSURE: 124 MMHG | WEIGHT: 219 LBS | HEIGHT: 65 IN | HEART RATE: 88 BPM | TEMPERATURE: 97.8 F | DIASTOLIC BLOOD PRESSURE: 72 MMHG | BODY MASS INDEX: 36.49 KG/M2

## 2024-07-23 DIAGNOSIS — G47.33 OSA (OBSTRUCTIVE SLEEP APNEA): Primary | ICD-10-CM

## 2024-07-23 DIAGNOSIS — G47.09 OTHER INSOMNIA: ICD-10-CM

## 2024-07-23 DIAGNOSIS — E66.9 OBESITY (BMI 30-39.9): ICD-10-CM

## 2024-07-23 PROCEDURE — G8427 DOCREV CUR MEDS BY ELIG CLIN: HCPCS | Performed by: PHYSICIAN ASSISTANT

## 2024-07-23 PROCEDURE — 3074F SYST BP LT 130 MM HG: CPT | Performed by: PHYSICIAN ASSISTANT

## 2024-07-23 PROCEDURE — 1036F TOBACCO NON-USER: CPT | Performed by: PHYSICIAN ASSISTANT

## 2024-07-23 PROCEDURE — 3078F DIAST BP <80 MM HG: CPT | Performed by: PHYSICIAN ASSISTANT

## 2024-07-23 PROCEDURE — G8399 PT W/DXA RESULTS DOCUMENT: HCPCS | Performed by: PHYSICIAN ASSISTANT

## 2024-07-23 PROCEDURE — 1090F PRES/ABSN URINE INCON ASSESS: CPT | Performed by: PHYSICIAN ASSISTANT

## 2024-07-23 PROCEDURE — G8417 CALC BMI ABV UP PARAM F/U: HCPCS | Performed by: PHYSICIAN ASSISTANT

## 2024-07-23 PROCEDURE — 99214 OFFICE O/P EST MOD 30 MIN: CPT | Performed by: PHYSICIAN ASSISTANT

## 2024-07-23 PROCEDURE — 1123F ACP DISCUSS/DSCN MKR DOCD: CPT | Performed by: PHYSICIAN ASSISTANT

## 2024-07-23 ASSESSMENT — ENCOUNTER SYMPTOMS
NAUSEA: 0
STRIDOR: 0
EYES NEGATIVE: 1
DIARRHEA: 0
ALLERGIC/IMMUNOLOGIC NEGATIVE: 1
SHORTNESS OF BREATH: 0
CHEST TIGHTNESS: 0
WHEEZING: 0
COUGH: 0
BACK PAIN: 0

## 2024-07-23 NOTE — PROGRESS NOTES
[facility]      Niesha Briesno         [id number]  7/23/2024   Chief Complaint   Patient presents with    Follow-up     1 year MAMI follow up         Pt of Dr. Laureen ADAIR Download:   Original or initial AHI: 68     Date of initial study: 10/18/2018      Compliant  100%     Noncompliant 0 %     PAP Type Cpap   Level  7cmH2O    Avg Hrs/Day 4 hours 48 minutes   AHI: 10.7   Recorded compliance dates , 06/22/2024  to 07/21/2024   Machine/Mfg:   [x] ResMed    [] Respironics/Dreamstation   Interface:   [] Nasal    [x] Nasal pillows   [] FFM      Provider:      [] SR-HME     [][employer]a     [] [employer]co    [x] [employer]    [] [employer]               [] P[employer]R Medical      [] Adaptive    [] [place]:      [] Other    Neck Size: 18.75  Mallampati Mallampati 3  ESS:  7  SAQLI: 82    Here is a scan of the most recent download:                                  Presentation:   Niesha presents for sleep medicine follow up for obstructive sleep apnea  Since the last visit, Niesha is doing well with PAP.  AHI is elevated.  She is waking up a few times at night, 3-4 times.  Occ takes a nap.     Equipment issues:  The pressure is  acceptable, the mask is acceptable     Sleep issues:  Do you feel better? Yes  More rested?Yes   Better concentration? yes    Progress History:   Since last visit any new medical issues? No  New ER or hospital visits? No      Review of Systems -   Review of Systems   Constitutional:  Negative for activity change, appetite change, chills and fever.   HENT:  Negative for congestion and postnasal drip.    Eyes: Negative.    Respiratory:  Negative for cough, chest tightness, shortness of breath, wheezing and stridor.    Cardiovascular:  Negative for chest pain and leg swelling.   Gastrointestinal:  Negative for diarrhea and nausea.   Endocrine: Negative.    Genitourinary: Negative.    Musculoskeletal: Negative.  Negative for arthralgias and back pain.   Skin: Negative.

## 2024-10-24 ENCOUNTER — LAB (OUTPATIENT)
Dept: FAMILY MEDICINE CLINIC | Age: 84
End: 2024-10-24
Payer: MEDICARE

## 2024-10-24 DIAGNOSIS — I10 PRIMARY HYPERTENSION: ICD-10-CM

## 2024-10-24 DIAGNOSIS — E78.5 HYPERLIPIDEMIA LDL GOAL <130: ICD-10-CM

## 2024-10-24 LAB
ALBUMIN SERPL BCG-MCNC: 4.1 G/DL (ref 3.5–5.1)
ALP SERPL-CCNC: 67 U/L (ref 38–126)
ALT SERPL W/O P-5'-P-CCNC: 20 U/L (ref 11–66)
ANION GAP SERPL CALC-SCNC: 14 MEQ/L (ref 8–16)
AST SERPL-CCNC: 17 U/L (ref 5–40)
BILIRUB CONJ SERPL-MCNC: 0.2 MG/DL (ref 0.1–13.8)
BILIRUB SERPL-MCNC: 0.5 MG/DL (ref 0.3–1.2)
BUN SERPL-MCNC: 16 MG/DL (ref 7–22)
CALCIUM SERPL-MCNC: 9.4 MG/DL (ref 8.5–10.5)
CHLORIDE SERPL-SCNC: 100 MEQ/L (ref 98–111)
CHOLEST SERPL-MCNC: 157 MG/DL (ref 100–199)
CO2 SERPL-SCNC: 29 MEQ/L (ref 23–33)
CREAT SERPL-MCNC: 0.7 MG/DL (ref 0.4–1.2)
DEPRECATED RDW RBC AUTO: 45.1 FL (ref 35–45)
ERYTHROCYTE [DISTWIDTH] IN BLOOD BY AUTOMATED COUNT: 14.1 % (ref 11.5–14.5)
GFR SERPL CREATININE-BSD FRML MDRD: 85 ML/MIN/1.73M2
GLUCOSE SERPL-MCNC: 95 MG/DL (ref 70–108)
HCT VFR BLD AUTO: 42.2 % (ref 37–47)
HDLC SERPL-MCNC: 66 MG/DL
HGB BLD-MCNC: 13.7 GM/DL (ref 12–16)
LDLC SERPL CALC-MCNC: 72 MG/DL
MCH RBC QN AUTO: 28.4 PG (ref 26–33)
MCHC RBC AUTO-ENTMCNC: 32.5 GM/DL (ref 32.2–35.5)
MCV RBC AUTO: 87.6 FL (ref 81–99)
PLATELET # BLD AUTO: 240 THOU/MM3 (ref 130–400)
PMV BLD AUTO: 11.1 FL (ref 9.4–12.4)
POTASSIUM SERPL-SCNC: 3.8 MEQ/L (ref 3.5–5.2)
PROT SERPL-MCNC: 7.5 G/DL (ref 6.1–8)
RBC # BLD AUTO: 4.82 MILL/MM3 (ref 4.2–5.4)
SODIUM SERPL-SCNC: 143 MEQ/L (ref 135–145)
TRIGL SERPL-MCNC: 95 MG/DL (ref 0–199)
WBC # BLD AUTO: 6.7 THOU/MM3 (ref 4.8–10.8)

## 2024-10-24 PROCEDURE — 36415 COLL VENOUS BLD VENIPUNCTURE: CPT | Performed by: STUDENT IN AN ORGANIZED HEALTH CARE EDUCATION/TRAINING PROGRAM

## 2024-11-04 ENCOUNTER — OFFICE VISIT (OUTPATIENT)
Dept: CARDIOLOGY CLINIC | Age: 84
End: 2024-11-04

## 2024-11-04 VITALS
WEIGHT: 207.2 LBS | SYSTOLIC BLOOD PRESSURE: 134 MMHG | DIASTOLIC BLOOD PRESSURE: 82 MMHG | HEART RATE: 83 BPM | BODY MASS INDEX: 35.37 KG/M2 | HEIGHT: 64 IN

## 2024-11-04 DIAGNOSIS — I50.32 CHRONIC DIASTOLIC CHF (CONGESTIVE HEART FAILURE), NYHA CLASS 2 (HCC): Primary | ICD-10-CM

## 2024-11-04 DIAGNOSIS — R06.02 SHORTNESS OF BREATH: ICD-10-CM

## 2024-11-04 NOTE — PROGRESS NOTES
[facility] [place] [facility]  [facility]ARDIOLOG[facility]  [address]  [address]  [facility] [place] [address]  Dept: [phone]  Dept Fax: [phone]  Loc: [phone]    Visit Date: 11/4/2024    Ms. Briseno is a 84 y.o. female  who presented for:  Chief Complaint   Patient presents with    New Patient       HPI:   85 yo F c hx of MAMI on CPAP, HTN, HLD, diastolic HF is here to establish care. Used to Dr. Arauz.   She reports fair exercise tolerance, walks with cane.  Reports intermittent leg swelling.  She is already on Bumex 1mg daily        Current Outpatient Medications:     Misc. Devices (CPAP MACHINE) MISC, by Does not apply route Please change CPAP pressure to auto 5-8 cm H20. Download in 2 weeks, Disp: 1 each, Rfl: 0    meloxicam (MOBIC) 7.5 MG tablet, Take 1 tablet by mouth daily as needed for Pain, Disp: 30 tablet, Rfl: 0    bumetanide (BUMEX) 1 MG tablet, Take 1 tablet by mouth daily, Disp: 90 tablet, Rfl: 4    metoprolol succinate (TOPROL XL) 50 MG extended release tablet, TAKE 1 TABLET EVERY DAY, Disp: 90 tablet, Rfl: 4    dilTIAZem (CARDIZEM CD) 120 MG extended release capsule, Take 1 capsule by mouth daily, Disp: 90 capsule, Rfl: 4    potassium chloride (KLOR-CON M) 20 MEQ extended release tablet, take 1 tablet by mouth once daily, Disp: 90 tablet, Rfl: 4    Cholecalciferol (VITAMIN D) 10 MCG/ML LIQD, Take by mouth, Disp: , Rfl:     Respiratory Therapy Supplies (ADULT MASK) MISC, Please do mask desensitization and provide mask of patient's choice., Disp: 1 each, Rfl: 0    aspirin 81 MG tablet, Take 1 tablet by mouth daily, Disp: , Rfl:     acetaminophen (TYLENOL) 500 MG tablet, Take 1 tablet by mouth 4 times daily as needed for Pain (Patient not taking: Reported on 11/4/2024), Disp: 360 tablet, Rfl: 1    diclofenac sodium (VOLTAREN) 1 % GEL, Apply 4 g topically 4 times daily (Patient not taking: Reported on 11/4/2024), Disp: 350 g, Rfl: 0    Past Medical History  Niesha  has a past

## 2024-11-04 NOTE — PROGRESS NOTES
New patient- previous Dr. Arauz pt    EKG obtained in office today    Pt c/o BLE    Denies cp, sob, palpitations

## 2024-11-19 ENCOUNTER — HOSPITAL ENCOUNTER (OUTPATIENT)
Age: 84
Discharge: HOME | End: 2024-11-21
Attending: INTERNAL MEDICINE
Payer: MEDICARE

## 2024-11-19 DIAGNOSIS — R06.02 SHORTNESS OF BREATH: ICD-10-CM

## 2024-11-19 LAB
ECHO AO ASC DIAM: 3.6 CM
ECHO AV CUSP MM: 1.7 CM
ECHO AV PEAK GRADIENT: 9 MMHG
ECHO AV PEAK VELOCITY: 1.5 M/S
ECHO AV VELOCITY RATIO: 0.73
ECHO LA AREA 2C: 21.4 CM2
ECHO LA AREA 4C: 18.1 CM2
ECHO LA DIAMETER: 3.6 CM
ECHO LA MAJOR AXIS: 5.6 CM
ECHO LA MINOR AXIS: 6.3 CM
ECHO LA VOL BP: 57 ML (ref 22–52)
ECHO LA VOL MOD A2C: 60 ML (ref 22–52)
ECHO LA VOL MOD A4C: 49 ML (ref 22–52)
ECHO LV E' LATERAL VELOCITY: 10.8 CM/S
ECHO LV E' SEPTAL VELOCITY: 8.8 CM/S
ECHO LV EJECTION FRACTION BIPLANE: 57 % (ref 55–100)
ECHO LV FRACTIONAL SHORTENING: 29 % (ref 28–44)
ECHO LV INTERNAL DIMENSION DIASTOLIC: 4.1 CM (ref 3.9–5.3)
ECHO LV INTERNAL DIMENSION SYSTOLIC: 2.9 CM
ECHO LV ISOVOLUMETRIC RELAXATION TIME (IVRT): 67 MS
ECHO LV IVSD: 1.1 CM (ref 0.6–0.9)
ECHO LV MASS 2D: 151.3 G (ref 67–162)
ECHO LV POSTERIOR WALL DIASTOLIC: 1.1 CM (ref 0.6–0.9)
ECHO LV RELATIVE WALL THICKNESS RATIO: 0.54
ECHO LVOT PEAK GRADIENT: 5 MMHG
ECHO LVOT PEAK VELOCITY: 1.1 M/S
ECHO MV A VELOCITY: 1.06 M/S
ECHO MV E DECELERATION TIME (DT): 243 MS
ECHO MV E VELOCITY: 1.22 M/S
ECHO MV E/A RATIO: 1.15
ECHO MV E/E' LATERAL: 11.3
ECHO MV E/E' RATIO (AVERAGED): 12.58
ECHO MV E/E' SEPTAL: 13.86
ECHO PV MAX VELOCITY: 0.8 M/S
ECHO PV PEAK GRADIENT: 3 MMHG
ECHO RV INTERNAL DIMENSION: 2.9 CM
ECHO RV TAPSE: 2.1 CM (ref 1.7–?)
ECHO TV E WAVE: 0.6 M/S
ECHO TV REGURGITANT MAX VELOCITY: 2.93 M/S
ECHO TV REGURGITANT PEAK GRADIENT: 34 MMHG

## 2024-11-19 PROCEDURE — 93306 TTE W/DOPPLER COMPLETE: CPT

## 2024-11-26 DIAGNOSIS — I50.32 CHRONIC DIASTOLIC CHF (CONGESTIVE HEART FAILURE), NYHA CLASS 2 (HCC): ICD-10-CM

## 2024-11-27 RX ORDER — POTASSIUM CHLORIDE 1500 MG/1
TABLET, EXTENDED RELEASE ORAL
Qty: 90 TABLET | Refills: 3 | Status: SHIPPED | OUTPATIENT
Start: 2024-11-27

## 2025-01-01 DIAGNOSIS — I10 PRIMARY HYPERTENSION: ICD-10-CM

## 2025-01-01 DIAGNOSIS — I50.32 CHRONIC DIASTOLIC CHF (CONGESTIVE HEART FAILURE), NYHA CLASS 2 (HCC): ICD-10-CM

## 2025-01-02 RX ORDER — DILTIAZEM HYDROCHLORIDE 120 MG/1
120 CAPSULE, COATED, EXTENDED RELEASE ORAL DAILY
Qty: 90 CAPSULE | Refills: 2 | Status: SHIPPED | OUTPATIENT
Start: 2025-01-02

## 2025-01-02 RX ORDER — BUMETANIDE 1 MG/1
1 TABLET ORAL DAILY
Qty: 90 TABLET | Refills: 2 | Status: SHIPPED | OUTPATIENT
Start: 2025-01-02

## 2025-01-02 RX ORDER — METOPROLOL SUCCINATE 50 MG/1
TABLET, EXTENDED RELEASE ORAL
Qty: 90 TABLET | Refills: 2 | Status: SHIPPED | OUTPATIENT
Start: 2025-01-02

## 2025-05-14 NOTE — PATIENT INSTRUCTIONS
Your Provider for Today: Dr. Laird  Your nurses for today: Andie    You may receive a survey regarding the care you received during your visit.  Your input is valuable to us.  We encourage you to complete and return your survey.  We hope you will choose us in the future for your healthcare needs.

## 2025-05-15 ENCOUNTER — OFFICE VISIT (OUTPATIENT)
Dept: CARDIOLOGY CLINIC | Age: 85
End: 2025-05-15
Payer: MEDICARE

## 2025-05-15 VITALS
BODY MASS INDEX: 37.05 KG/M2 | HEIGHT: 64 IN | DIASTOLIC BLOOD PRESSURE: 80 MMHG | HEART RATE: 85 BPM | SYSTOLIC BLOOD PRESSURE: 136 MMHG | WEIGHT: 217 LBS

## 2025-05-15 DIAGNOSIS — I50.9 CHRONIC HEART FAILURE, UNSPECIFIED HEART FAILURE TYPE (HCC): Primary | ICD-10-CM

## 2025-05-15 PROCEDURE — 99214 OFFICE O/P EST MOD 30 MIN: CPT | Performed by: INTERNAL MEDICINE

## 2025-05-15 PROCEDURE — 3075F SYST BP GE 130 - 139MM HG: CPT | Performed by: INTERNAL MEDICINE

## 2025-05-15 PROCEDURE — G8399 PT W/DXA RESULTS DOCUMENT: HCPCS | Performed by: INTERNAL MEDICINE

## 2025-05-15 PROCEDURE — 1090F PRES/ABSN URINE INCON ASSESS: CPT | Performed by: INTERNAL MEDICINE

## 2025-05-15 PROCEDURE — 1159F MED LIST DOCD IN RCRD: CPT | Performed by: INTERNAL MEDICINE

## 2025-05-15 PROCEDURE — 1123F ACP DISCUSS/DSCN MKR DOCD: CPT | Performed by: INTERNAL MEDICINE

## 2025-05-15 PROCEDURE — G8417 CALC BMI ABV UP PARAM F/U: HCPCS | Performed by: INTERNAL MEDICINE

## 2025-05-15 PROCEDURE — 3079F DIAST BP 80-89 MM HG: CPT | Performed by: INTERNAL MEDICINE

## 2025-05-15 PROCEDURE — G8427 DOCREV CUR MEDS BY ELIG CLIN: HCPCS | Performed by: INTERNAL MEDICINE

## 2025-05-15 PROCEDURE — 1036F TOBACCO NON-USER: CPT | Performed by: INTERNAL MEDICINE

## 2025-05-15 RX ORDER — SPIRONOLACTONE 25 MG/1
25 TABLET ORAL DAILY
Qty: 90 TABLET | Refills: 1 | Status: SHIPPED | OUTPATIENT
Start: 2025-05-15

## 2025-05-15 NOTE — PROGRESS NOTES
[facility] [place] [facility]  [facility] CARDIOLOGY  [address]  [address]  [facility] [address]  Dept: [phone]  Dept Fax: [phone]  Loc: [phone]    Visit Date: 5/15/2025    Chief Complaint   Patient presents with    Follow-up     6 month follow up        HPI:   Ms. Briseno is a 85 y.o. female  who presented for:  History of Present Illness  The patient is an 85-year-old female who presents for evaluation of diastolic heart failure.    She reports no significant chest discomfort, exacerbation of dyspnea, or syncope. Her primary concern is persistent leg pain. Despite her condition, she maintains a level of physical activity, including walking from the parking lot to the clinic today and engaging in pedal exercises at home.    SOCIAL HISTORY  Exercise: Walks from the parking lot and pedals a lot in the house.      Current Outpatient Medications:     bumetanide (BUMEX) 1 MG tablet, TAKE 1 TABLET EVERY DAY, Disp: 90 tablet, Rfl: 2    dilTIAZem (CARDIZEM CD) 120 MG extended release capsule, TAKE 1 CAPSULE EVERY DAY, Disp: 90 capsule, Rfl: 2    metoprolol succinate (TOPROL XL) 50 MG extended release tablet, TAKE 1 TABLET EVERY DAY, Disp: 90 tablet, Rfl: 2    potassium chloride (KLOR-CON M) 20 MEQ extended release tablet, TAKE 1 TABLET EVERY DAY, Disp: 90 tablet, Rfl: 3    Misc. Devices (CPAP MACHINE) MISC, by Does not apply route Please change CPAP pressure to auto 5-8 cm H20. Download in 2 weeks, Disp: 1 each, Rfl: 0    meloxicam (MOBIC) 7.5 MG tablet, Take 1 tablet by mouth daily as needed for Pain, Disp: 30 tablet, Rfl: 0    acetaminophen (TYLENOL) 500 MG tablet, Take 1 tablet by mouth 4 times daily as needed for Pain (Patient not taking: Reported on 5/15/2025), Disp: 360 tablet, Rfl: 1    diclofenac sodium (VOLTAREN) 1 % GEL, Apply 4 g topically 4 times daily (Patient not taking: Reported on 5/15/2025), Disp: 350 g, Rfl: 0    Cholecalciferol (VITAMIN D) 10 MCG/ML LIQD, Take by

## 2025-05-15 NOTE — PROGRESS NOTES
6 month follow up   Med list up to date and pharmacy verified  Denies any chest pain, SOB, palpitations  Reports sometimes will have edema.   Last EKG 11/2024 as well as ECHO    Reports having a lot of aching in bilateral legs.

## 2025-05-22 ENCOUNTER — TELEPHONE (OUTPATIENT)
Dept: PULMONOLOGY | Age: 85
End: 2025-05-22

## 2025-05-22 NOTE — TELEPHONE ENCOUNTER
We called to reschedule your 7/29/25 appointment with Lesley Crawford as Pushpa is no longer practicing at [facility].  Unable to leave message, voicemail not setup.  We cancelled that appointment and rescheduled you to Dora CUNNINGHAM on 7/22/25 at 2:40PM.    Appointment reminder mailed 5/22/25.

## 2025-07-16 NOTE — PROGRESS NOTES
[facility] [place] [facility]  [facility] CARDIOLOGY  [address]  [address]  [facility] [address]  Dept: [phone]  Dept Fax: [phone]  Loc: [phone]    Visit Date: 7/17/2025    Niesha Briseno is a 85 y.o. female who presents todayfor:  Chief Complaint   Patient presents with    Follow-up     2 mo fu      Cardiologist: Eitan    Hx of HFpEF, MAMI on cpap, HTN, HLD    HPI: 2 month f/u  Maybe a slight improvement after starting aldactone, swelling a little better. Has chronic fatigue most fo the time. No chest pain. No obvious sob. Walked a while today getting in from the parking lot without much issues.   Past Surgical History:   Procedure Laterality Date    APPENDECTOMY  2014    CARDIAC CATHETERIZATION  07/07/2003    [facility]    CHOLECYSTECTOMY  2002    HYSTERECTOMY (CERVIX STATUS UNKNOWN)  1990    HYSTERECTOMY, TOTAL ABDOMINAL (CERVIX REMOVED)       Family History   Problem Relation Age of Onset    Cancer Mother     Stroke Mother     Diabetes Sister     Diabetes Brother     Heart Disease Neg Hx     High Blood Pressure Neg Hx      Social History     Tobacco Use    Smoking status: Never    Smokeless tobacco: Never   Substance Use Topics    Alcohol use: No      Current Outpatient Medications   Medication Sig Dispense Refill    spironolactone (ALDACTONE) 25 MG tablet Take 1 tablet by mouth daily 90 tablet 1    bumetanide (BUMEX) 1 MG tablet TAKE 1 TABLET EVERY DAY 90 tablet 2    dilTIAZem (CARDIZEM CD) 120 MG extended release capsule TAKE 1 CAPSULE EVERY DAY 90 capsule 2    metoprolol succinate (TOPROL XL) 50 MG extended release tablet TAKE 1 TABLET EVERY DAY 90 tablet 2    potassium chloride (KLOR-CON M) 20 MEQ extended release tablet TAKE 1 TABLET EVERY DAY 90 tablet 3    Misc. Devices (CPAP MACHINE) MISC by Does not apply route Please change CPAP pressure to auto 5-8 cm H20.  Download in 2 weeks 1 each 0    meloxicam (MOBIC) 7.5 MG tablet Take 1 tablet by mouth daily as needed for Pain 30

## 2025-07-17 ENCOUNTER — OFFICE VISIT (OUTPATIENT)
Dept: CARDIOLOGY CLINIC | Age: 85
End: 2025-07-17
Payer: MEDICARE

## 2025-07-17 VITALS
WEIGHT: 212 LBS | DIASTOLIC BLOOD PRESSURE: 88 MMHG | HEIGHT: 64 IN | BODY MASS INDEX: 36.19 KG/M2 | HEART RATE: 68 BPM | SYSTOLIC BLOOD PRESSURE: 150 MMHG

## 2025-07-17 DIAGNOSIS — I10 ESSENTIAL HYPERTENSION: ICD-10-CM

## 2025-07-17 DIAGNOSIS — I50.32 CHRONIC DIASTOLIC CHF (CONGESTIVE HEART FAILURE) (HCC): Primary | ICD-10-CM

## 2025-07-17 DIAGNOSIS — E78.5 DYSLIPIDEMIA: ICD-10-CM

## 2025-07-17 PROCEDURE — G8427 DOCREV CUR MEDS BY ELIG CLIN: HCPCS | Performed by: STUDENT IN AN ORGANIZED HEALTH CARE EDUCATION/TRAINING PROGRAM

## 2025-07-17 PROCEDURE — 99214 OFFICE O/P EST MOD 30 MIN: CPT | Performed by: STUDENT IN AN ORGANIZED HEALTH CARE EDUCATION/TRAINING PROGRAM

## 2025-07-17 PROCEDURE — G8399 PT W/DXA RESULTS DOCUMENT: HCPCS | Performed by: STUDENT IN AN ORGANIZED HEALTH CARE EDUCATION/TRAINING PROGRAM

## 2025-07-17 PROCEDURE — 1090F PRES/ABSN URINE INCON ASSESS: CPT | Performed by: STUDENT IN AN ORGANIZED HEALTH CARE EDUCATION/TRAINING PROGRAM

## 2025-07-17 PROCEDURE — G8417 CALC BMI ABV UP PARAM F/U: HCPCS | Performed by: STUDENT IN AN ORGANIZED HEALTH CARE EDUCATION/TRAINING PROGRAM

## 2025-07-17 PROCEDURE — 1123F ACP DISCUSS/DSCN MKR DOCD: CPT | Performed by: STUDENT IN AN ORGANIZED HEALTH CARE EDUCATION/TRAINING PROGRAM

## 2025-07-17 PROCEDURE — 3079F DIAST BP 80-89 MM HG: CPT | Performed by: STUDENT IN AN ORGANIZED HEALTH CARE EDUCATION/TRAINING PROGRAM

## 2025-07-17 PROCEDURE — 1036F TOBACCO NON-USER: CPT | Performed by: STUDENT IN AN ORGANIZED HEALTH CARE EDUCATION/TRAINING PROGRAM

## 2025-07-17 PROCEDURE — 3077F SYST BP >= 140 MM HG: CPT | Performed by: STUDENT IN AN ORGANIZED HEALTH CARE EDUCATION/TRAINING PROGRAM

## 2025-07-17 PROCEDURE — 1159F MED LIST DOCD IN RCRD: CPT | Performed by: STUDENT IN AN ORGANIZED HEALTH CARE EDUCATION/TRAINING PROGRAM

## 2025-07-22 ENCOUNTER — OFFICE VISIT (OUTPATIENT)
Age: 85
End: 2025-07-22
Payer: MEDICARE

## 2025-07-22 VITALS
HEART RATE: 83 BPM | TEMPERATURE: 98.7 F | WEIGHT: 217 LBS | OXYGEN SATURATION: 93 % | HEIGHT: 65 IN | BODY MASS INDEX: 36.15 KG/M2 | SYSTOLIC BLOOD PRESSURE: 140 MMHG | DIASTOLIC BLOOD PRESSURE: 84 MMHG

## 2025-07-22 DIAGNOSIS — G47.33 OSA ON CPAP: Primary | ICD-10-CM

## 2025-07-22 DIAGNOSIS — I10 HYPERTENSION, UNSPECIFIED TYPE: ICD-10-CM

## 2025-07-22 PROCEDURE — 1036F TOBACCO NON-USER: CPT | Performed by: NURSE PRACTITIONER

## 2025-07-22 PROCEDURE — G8427 DOCREV CUR MEDS BY ELIG CLIN: HCPCS | Performed by: NURSE PRACTITIONER

## 2025-07-22 PROCEDURE — 3077F SYST BP >= 140 MM HG: CPT | Performed by: NURSE PRACTITIONER

## 2025-07-22 PROCEDURE — G8417 CALC BMI ABV UP PARAM F/U: HCPCS | Performed by: NURSE PRACTITIONER

## 2025-07-22 PROCEDURE — G8399 PT W/DXA RESULTS DOCUMENT: HCPCS | Performed by: NURSE PRACTITIONER

## 2025-07-22 PROCEDURE — 1159F MED LIST DOCD IN RCRD: CPT | Performed by: NURSE PRACTITIONER

## 2025-07-22 PROCEDURE — 3079F DIAST BP 80-89 MM HG: CPT | Performed by: NURSE PRACTITIONER

## 2025-07-22 PROCEDURE — 1160F RVW MEDS BY RX/DR IN RCRD: CPT | Performed by: NURSE PRACTITIONER

## 2025-07-22 PROCEDURE — 1123F ACP DISCUSS/DSCN MKR DOCD: CPT | Performed by: NURSE PRACTITIONER

## 2025-07-22 PROCEDURE — 1090F PRES/ABSN URINE INCON ASSESS: CPT | Performed by: NURSE PRACTITIONER

## 2025-07-22 PROCEDURE — 99214 OFFICE O/P EST MOD 30 MIN: CPT | Performed by: NURSE PRACTITIONER

## 2025-07-22 NOTE — PROGRESS NOTES
[facility]       Niesha Briseno         [id number]  7/22/2025   Chief Complaint   Patient presents with    Follow-up     1 year MAMI with download         Pt of Dr. Laureen ADAIR Download:   Original or initial AHI: 68     Date of initial study: 10.18.18      Compliant  97%     Noncompliant 3 %     PAP Type Autoset Level  5/8   Avg Hrs/Day 5hrs 7m  AHI: 4.9   Leaks : 95 th percentile: 7.9   Recorded compliance dates , 6.21.25  to 7.20.25   Machine/Mfg:   [x] ResMed    [] Respironics/Dreamstation   Interface:   [] Nasal    [x] Nasal pillows   [] FFM      Provider:      [] -DIEGO     []Jack     [] [employer]    [x] [employer]    [] [place]               [] [employer]      [] [employer]    [] [place]:      [] Other    Neck Size: 18.75  Mallampati 3  ESS:  8  SAQLI:73     Here is a scan of the most recent download:                Results  - Diagnostic Testing:    - PAP machine download: Less than 5 apneas an hour, averaging at 4.9  '  Presentation:   History of Present Illness  The patient is an 85-year-old female who presents for a 1-year follow-up of obstructive sleep apnea.    Obstructive Sleep Apnea  She reports no issues with her PAP machine or mask and has been using her mask nightly without any complications. The current settings of her machine have significantly reduced her apneas from an average of 10 episodes per hour to 4.9 episodes per hour.     Cardiovascular Health  She has been under the care of a cardiologist and was prescribed a new medication. Her blood pressure was slightly elevated during her last visit on 07/17/2025, but it normalized before she left the clinic. She has been on other heart medications for approximately 7 years.        Physical Exam:    BMI:  Body mass index is 36.11 kg/m².    Wt Readings from Last 3 Encounters:   07/22/25 98.4 kg (217 lb)   07/17/25 96.2 kg (212 lb)   05/15/25 98.4 kg (217 lb)     Weight stable / unchanged  Vitals: BP (!) 140/84 (BP Site: Left Upper Arm,

## 2025-07-28 ENCOUNTER — LAB (OUTPATIENT)
Dept: FAMILY MEDICINE CLINIC | Age: 85
End: 2025-07-28
Payer: MEDICARE

## 2025-07-28 DIAGNOSIS — I50.32 CHRONIC DIASTOLIC CHF (CONGESTIVE HEART FAILURE) (HCC): ICD-10-CM

## 2025-07-28 LAB
ANION GAP SERPL CALC-SCNC: 16 MEQ/L (ref 8–16)
BUN SERPL-MCNC: 21 MG/DL (ref 8–23)
CALCIUM SERPL-MCNC: 9.6 MG/DL (ref 8.8–10.2)
CHLORIDE SERPL-SCNC: 106 MEQ/L (ref 98–111)
CO2 SERPL-SCNC: 22 MEQ/L (ref 22–29)
CREAT SERPL-MCNC: 1.2 MG/DL (ref 0.5–0.9)
GFR SERPL CREATININE-BSD FRML MDRD: 45 ML/MIN/1.73M2
GLUCOSE SERPL-MCNC: 99 MG/DL (ref 74–109)
POTASSIUM SERPL-SCNC: 4.2 MEQ/L (ref 3.5–5.2)
SODIUM SERPL-SCNC: 144 MEQ/L (ref 135–145)

## 2025-07-28 PROCEDURE — 36415 COLL VENOUS BLD VENIPUNCTURE: CPT | Performed by: STUDENT IN AN ORGANIZED HEALTH CARE EDUCATION/TRAINING PROGRAM

## 2025-08-05 ENCOUNTER — TELEPHONE (OUTPATIENT)
Dept: CARDIOLOGY CLINIC | Age: 85
End: 2025-08-05

## 2025-08-10 DIAGNOSIS — I50.32 CHRONIC DIASTOLIC CHF (CONGESTIVE HEART FAILURE), NYHA CLASS 2 (HCC): ICD-10-CM

## 2025-08-10 DIAGNOSIS — I10 PRIMARY HYPERTENSION: ICD-10-CM

## 2025-08-11 RX ORDER — METOPROLOL SUCCINATE 50 MG/1
50 TABLET, EXTENDED RELEASE ORAL DAILY
Qty: 90 TABLET | Refills: 1 | Status: SHIPPED | OUTPATIENT
Start: 2025-08-11

## 2025-08-11 RX ORDER — BUMETANIDE 1 MG/1
1 TABLET ORAL DAILY
Qty: 90 TABLET | Refills: 1 | Status: SHIPPED | OUTPATIENT
Start: 2025-08-11

## 2025-08-11 RX ORDER — DILTIAZEM HYDROCHLORIDE 120 MG/1
120 CAPSULE, COATED, EXTENDED RELEASE ORAL DAILY
Qty: 90 CAPSULE | Refills: 1 | Status: SHIPPED | OUTPATIENT
Start: 2025-08-11